# Patient Record
Sex: FEMALE | Race: BLACK OR AFRICAN AMERICAN | Employment: UNEMPLOYED | URBAN - METROPOLITAN AREA
[De-identification: names, ages, dates, MRNs, and addresses within clinical notes are randomized per-mention and may not be internally consistent; named-entity substitution may affect disease eponyms.]

---

## 2017-01-16 ENCOUNTER — ANTI-COAG VISIT (OUTPATIENT)
Dept: ONCOLOGY | Age: 82
End: 2017-01-16

## 2017-01-16 VITALS — SYSTOLIC BLOOD PRESSURE: 124 MMHG | DIASTOLIC BLOOD PRESSURE: 63 MMHG | TEMPERATURE: 97.6 F | HEART RATE: 60 BPM

## 2017-01-16 DIAGNOSIS — I82.409 THROMBOEMBOLISM OF DEEP VEINS OF LOWER EXTREMITY, UNSPECIFIED LATERALITY (HCC): Primary | ICD-10-CM

## 2017-01-16 LAB
INR BLD: 2.5 (ref 2–3)
VALID INTERNAL CONTROL?: YES

## 2017-01-17 ENCOUNTER — HOSPITAL ENCOUNTER (OUTPATIENT)
Dept: LAB | Age: 82
Discharge: HOME | End: 2017-01-17

## 2017-01-17 ENCOUNTER — OFFICE VISIT (OUTPATIENT)
Dept: FAMILY MEDICINE CLINIC | Age: 82
End: 2017-01-17

## 2017-01-17 VITALS
SYSTOLIC BLOOD PRESSURE: 132 MMHG | DIASTOLIC BLOOD PRESSURE: 82 MMHG | HEIGHT: 59 IN | OXYGEN SATURATION: 98 % | RESPIRATION RATE: 16 BRPM | TEMPERATURE: 97.9 F | HEART RATE: 58 BPM

## 2017-01-17 DIAGNOSIS — E55.9 VITAMIN D DEFICIENCY: ICD-10-CM

## 2017-01-17 DIAGNOSIS — J30.2 SEASONAL ALLERGIC RHINITIS, UNSPECIFIED ALLERGIC RHINITIS TRIGGER: ICD-10-CM

## 2017-01-17 DIAGNOSIS — Z23 ENCOUNTER FOR IMMUNIZATION: ICD-10-CM

## 2017-01-17 DIAGNOSIS — M17.0 PRIMARY OSTEOARTHRITIS OF BOTH KNEES: ICD-10-CM

## 2017-01-17 DIAGNOSIS — E78.5 HYPERLIPIDEMIA WITH TARGET LDL LESS THAN 130: ICD-10-CM

## 2017-01-17 DIAGNOSIS — I10 ESSENTIAL HYPERTENSION: Primary | ICD-10-CM

## 2017-01-17 DIAGNOSIS — E83.42 HYPOMAGNESEMIA: ICD-10-CM

## 2017-01-17 PROCEDURE — 99001 SPECIMEN HANDLING PT-LAB: CPT | Performed by: FAMILY MEDICINE

## 2017-01-17 NOTE — MR AVS SNAPSHOT
Visit Information Date & Time Provider Department Dept. Phone Encounter #  
 1/17/2017 10:45 AM Liat Sousa, [address] [facility] [address] [id number] Follow-up Instructions Return in about 4 months (around 5/17/2017). Your Appointments 2/1/2017  9:00 AM  
ANTICOAG NURSE with [facility] [facility] ([facility]) Appt Note: inr  
 [address] [facility] [address] [facility] [address] [facility] [address]  
  
   
 [address]  
  
    
 2/13/2017 10:15 AM  
Office Visit with John De La Cruz MD  
[address]  Oncology [facility]) Appt Note: [address], [address] [place] [address] Upcoming Health Maintenance Date Due  
 GLAUCOMA SCREENING Q2Y 4/9/2016 MEDICARE YEARLY EXAM 7/6/2017 DTaP/Tdap/Td series (2 - Td) 7/5/2026 Allergies as of 1/17/2017  Review Complete On: 1/17/2017 By: Liat Sousa MD  
  
 Severity Noted Reaction Type Reactions Lipitor [Atorvastatin]  07/22/2013   Side Effect Myalgia Current Immunizations  Reviewed on 9/22/2014 Name Date Influenza High Dose Vaccine PF 1/17/2017 Influenza Vaccine PF 9/22/2014 Pneumococcal Conjugate (PCV-13) 7/5/2016 Pneumococcal Polysaccharide (PPSV-23) 9/19/2013 Not reviewed this visit You Were Diagnosed With   
  
 Codes Comments Essential hypertension    -  Primary ICD-10-CM: I10 
ICD-9-CM: 401.9 Encounter for immunization     ICD-10-CM: M78 ICD-9-CM: V03.89 Hyperlipidemia with target LDL less than 130     ICD-10-CM: E78.5 ICD-9-CM: 272.4 Vitamin D deficiency     ICD-10-CM: E55.9 ICD-9-CM: 268.9 Hypomagnesemia     ICD-10-CM: Z52.72 
ICD-9-CM: 275.2 Primary osteoarthritis of both knees     ICD-10-CM: M17.0 ICD-9-CM: 715.16   
 Seasonal allergic rhinitis, unspecified allergic rhinitis trigger     ICD-10-CM: J30.2 ICD-9-CM: 477.9 Vitals BP Pulse Temp Resp Height(growth percentile) SpO2  
 132/82 (BP 1 Location: Left arm, BP Patient Position: Sitting) (!) 58 97.9 °F (36.6 °C) (Oral) 16 4' 11\" (1.499 m) 98% OB Status Smoking Status Postmenopausal Never Smoker Preferred Pharmacy Pharmacy Name Phone [facility][id number] [address] [phone] Your Updated Medication List  
  
   
This list is accurate as of: 1/17/17 11:22 AM.  Always use your most recent med list.  
  
  
  
  
 albuterol 90 mcg/actuation inhaler Commonly known as:  PROVENTIL HFA, VENTOLIN HFA, PROAIR HFA Take 2 Puffs by inhalation every four (4) hours as needed for Wheezing. atenolol 50 mg tablet Commonly known as:  TENORMIN  
TAKE 1 TABLET (50 MG) BY ORAL ROUTE ONCE DAILY  
  
 cetirizine 10 mg tablet Commonly known as:  ZYRTEC Take 1 Tab by mouth daily. ergocalciferol 50,000 unit capsule Commonly known as:  ERGOCALCIFEROL Take 1 Cap by mouth every seven (7) days. fluticasone 50 mcg/actuation nasal spray Commonly known as:  FLONASE  
2 sprays each nostril once daily  
  
 magnesium oxide 400 mg tablet Commonly known as:  MAG-OX Take 1 Tab by mouth daily. miscellaneous medical supply Misc Please extend/provide mobile wheelchair  Dx: polio contracture  
  
 potassium chloride 20 mEq tablet Commonly known as:  KLOR-CON M20  
TAKE 1 TABLET (20 MEQ) BY ORAL ROUTE 2 TIMES PER DAY  
  
 pravastatin 40 mg tablet Commonly known as:  PRAVACHOL  
TAKE ONE TABLET BY MOUTH EVERY EVENING  
  
 traMADol 50 mg tablet Commonly known as:  ULTRAM  
Take 1 Tab by mouth every six (6) hours as needed for Pain.  
  
 valsartan-hydroCHLOROthiazide 160-12.5 mg per tablet Commonly known as:  DIOVAN HCT Take 1 Tab by mouth daily. * warfarin 5 mg tablet Commonly known as:  COUMADIN  
TAKE 1 TABLET (5 MG) BY ORAL ROUTE ONCE DAILY * warfarin 1 mg tablet Commonly known as:  COUMADIN Take 1-5 tablets by mouth daily pending the INR values * Notice: This list has 2 medication(s) that are the same as other medications prescribed for you. Read the directions carefully, and ask your doctor or other care provider to review them with you. We Performed the Following INFLUENZA VIRUS VACCINE, HIGH DOSE SEASONAL, PRESERVATIVE FREE [22352 CPT(R)] Follow-up Instructions Return in about 4 months (around 5/17/2017). To-Do List   
 01/17/2017 Lab:  METABOLIC PANEL, BASIC Patient Instructions DASH Diet: Care Instructions Your Care Instructions The DASH diet is an eating plan that can help lower your blood pressure. DASH stands for Dietary Approaches to Stop Hypertension. Hypertension is high blood pressure. The DASH diet focuses on eating foods that are high in calcium, potassium, and magnesium. These nutrients can lower blood pressure. The foods that are highest in these nutrients are fruits, vegetables, low-fat dairy products, nuts, seeds, and legumes. But taking calcium, potassium, and magnesium supplements instead of eating foods that are high in those nutrients does not have the same effect. The DASH diet also includes whole grains, fish, and poultry. The DASH diet is one of several lifestyle changes your doctor may recommend to lower your high blood pressure. Your doctor may also want you to decrease the amount of sodium in your diet. Lowering sodium while following the DASH diet can lower blood pressure even further than just the DASH diet alone. Follow-up care is a key part of your treatment and safety. Be sure to make and go to all appointments, and call your doctor if you are having problems.  It's also a good idea to know your test results and keep a list of the medicines you take. How can you care for yourself at home? Following the DASH diet · Eat 4 to 5 servings of fruit each day. A serving is 1 medium-sized piece of fruit, ½ cup chopped or canned fruit, 1/4 cup dried fruit, or 4 ounces (½ cup) of fruit juice. Choose fruit more often than fruit juice. · Eat 4 to 5 servings of vegetables each day. A serving is 1 cup of lettuce or raw leafy vegetables, ½ cup of chopped or cooked vegetables, or 4 ounces (½ cup) of vegetable juice. Choose vegetables more often than vegetable juice. · Get 2 to 3 servings of low-fat and fat-free dairy each day. A serving is 8 ounces of milk, 1 cup of yogurt, or 1 ½ ounces of cheese. · Eat 6 to 8 servings of grains each day. A serving is 1 slice of bread, 1 ounce of dry cereal, or ½ cup of cooked rice, pasta, or cooked cereal. Try to choose whole-grain products as much as possible. · Limit lean meat, poultry, and fish to 2 servings each day. A serving is 3 ounces, about the size of a deck of cards. · Eat 4 to 5 servings of nuts, seeds, and legumes (cooked dried beans, lentils, and split peas) each week. A serving is 1/3 cup of nuts, 2 tablespoons of seeds, or ½ cup of cooked beans or peas. · Limit fats and oils to 2 to 3 servings each day. A serving is 1 teaspoon of vegetable oil or 2 tablespoons of salad dressing. · Limit sweets and added sugars to 5 servings or less a week. A serving is 1 tablespoon jelly or jam, ½ cup sorbet, or 1 cup of lemonade. · Eat less than 2,300 milligrams (mg) of sodium a day. If you limit your sodium to 1,500 mg a day, you can lower your blood pressure even more. Tips for success · Start small. Do not try to make dramatic changes to your diet all at once. You might feel that you are missing out on your favorite foods and then be more likely to not follow the plan. Make small changes, and stick with them. Once those changes become habit, add a few more changes. · Try some of the following: ¨ Make it a goal to eat a fruit or vegetable at every meal and at snacks. This will make it easy to get the recommended amount of fruits and vegetables each day. ¨ Try yogurt topped with fruit and nuts for a snack or healthy dessert. ¨ Add lettuce, tomato, cucumber, and onion to sandwiches. ¨ Combine a ready-made pizza crust with low-fat mozzarella cheese and lots of vegetable toppings. Try using tomatoes, squash, spinach, broccoli, carrots, cauliflower, and onions. ¨ Have a variety of cut-up vegetables with a low-fat dip as an appetizer instead of chips and dip. ¨ Sprinkle sunflower seeds or chopped almonds over salads. Or try adding chopped walnuts or almonds to cooked vegetables. ¨ Try some vegetarian meals using beans and peas. Add garbanzo or kidney beans to salads. Make burritos and tacos with mashed erwin beans or black beans. Where can you learn more? Go to http://lawrence-yennifer.info/. Enter J861 in the search box to learn more about \"DASH Diet: Care Instructions. \" Current as of: March 23, 2016 Content Version: 11.1 © 0426-7188 [employer]. Care instructions adapted under license by [employer] (which disclaims liability or warranty for this information). If you have questions about a medical condition or this instruction, always ask your healthcare professional. [place] [address] any warranty or liability for your use of this information. Introducing [facility]! Winsome Kidd introduces LoveLula patient portal. Now you can access parts of your medical record, email your doctor's office, and request medication refills online. 1. In your internet browser, go to [email or web address]. Biglion/"[employer] 2. Click on the First Time User? Click Here link in the Sign In box. You will see the New Member Sign Up page. 3. Enter your LoveLula Access Code exactly as it appears below.  You will not need to use this code after youve completed the sign-up process. If you do not sign up before the expiration date, you must request a new code. · Streamup Access Code: [email or web address] Expires: 1/24/2017 10:49 AM 
 
4. Enter the last four digits of your Social Security Number (xxxx) and Date of Birth (mm/dd/yyyy) as indicated and click Submit. You will be taken to the next sign-up page. 5. Create a Streamup ID. This will be your Streamup login ID and cannot be changed, so think of one that is secure and easy to remember. 6. Create a Streamup password. You can change your password at any time. 7. Enter your Password Reset Question and Answer. This can be used at a later time if you forget your password. 8. Enter your e-mail address. You will receive e-mail notification when new information is available in [address]. 9. Click Sign Up. You can now view and download portions of your medical record. 10. Click the Download Summary menu link to download a portable copy of your medical information. If you have questions, please visit the Frequently Asked Questions section of the Streamup website. Remember, Streamup is NOT to be used for urgent needs. For medical emergencies, dial 911. Now available from your iPhone and Android! Please provide this summary of care documentation to your next provider. Your primary care clinician is listed as Vandana Jeter. If you have any questions after today's visit, please call [phone].

## 2017-01-17 NOTE — PROGRESS NOTES
Chief Complaint   Patient presents with    Hypertension    Cholesterol Problem    Osteoarthritis     Patient presents for flu vaccine. Consent obtained, Tolerated procedure well at right deltoid. Patient remained in office 10 minutes post vaccine.  No side effects noted    Lot# [id number]  Exp: 06/19/2017  [facility]  [address]: [id number]

## 2017-01-17 NOTE — PATIENT INSTRUCTIONS

## 2017-01-17 NOTE — PROGRESS NOTES
Valente Soares, 80 y.o.,  female    SUBJECTIVE  Routine ff-up    HTN- taking medications without problems, denies cp, sob, leg edema. Inconsistent with diet, she drinks mountain dew regularly. Continues to follow heme for chronic anticoag for h/o dvt     bilateral knee pain/OA-takes tramadol about every other day. H/o hypomg, vit d def. Inconsistent with po replacement, 'forgets' to take. 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 90 Tab 3    ergocalciferol (ERGOCALCIFEROL) 50,000 unit capsule Take 1 Cap by mouth every seven (7) days. 12 Cap 3    warfarin (COUMADIN) 5 mg tablet TAKE 1 TABLET (5 MG) BY ORAL ROUTE ONCE DAILY 30 Tab 11    pravastatin (PRAVACHOL) 40 mg tablet TAKE ONE TABLET BY MOUTH EVERY EVENING 30 Tab 11    valsartan-hydrochlorothiazide (DIOVAN HCT) 160-12.5 mg per tablet Take 1 Tab by mouth daily. 30 Tab 11    potassium chloride (KLOR-CON M20) 20 mEq tablet TAKE 1 TABLET (20 MEQ) BY ORAL ROUTE 2 TIMES PER DAY 60 Tab 5    albuterol (PROVENTIL HFA, VENTOLIN HFA, PROAIR HFA) 90 mcg/actuation inhaler Take 2 Puffs by inhalation every four (4) hours as needed for Wheezing.  1 Inhaler 0    fluticasone (FLONASE) 50 mcg/actuation nasal spray 2 sprays each nostril once daily 1 Bottle 0    traMADol (ULTRAM) 50 mg tablet Take 1 Tab by mouth every six (6) hours as needed for Pain. 30 Tab 2    cetirizine (ZYRTEC) 10 mg tablet Take 1 Tab by mouth daily. 30 Tab 11    miscellaneous medical supply misc Please extend/provide mobile wheelchair  Dx: polio contracture 1 Each 0    warfarin (COUMADIN) 1 mg tablet Take 1-5 tablets by mouth daily pending the INR values 150 Tab 11       Allergies   Allergen Reactions    Lipitor [Atorvastatin] Myalgia       Past Medical History   Diagnosis Date    Diverticulosis     Hypercholesterolemia     Hypertension     Polio     Thromboembolus ([address] 2012     Left leg, indefinite anticoag Dr. Gen Araya NR goal 1.5-2       Social History     Social History    Marital status: UNKNOWN     Spouse name: N/A    Number of children: N/A    Years of education: N/A     Occupational History    n/a      Social History Main Topics    Smoking status: Never Smoker    Smokeless tobacco: Never Used    Alcohol use No    Drug use: Not on file    Sexual activity: Not Currently     Partners: Male     Other Topics Concern    Not on file     Social History Narrative       Family History   Problem Relation Age of Onset    Asthma Father          OBJECTIVE    Physical Exam:     Visit Vitals    /82 (BP 1 Location: Left arm, BP Patient Position: Sitting)    Pulse (!) 58    Temp 97.9 °F (36.6 °C) (Oral)    Resp 16    Ht 4' 11\" (1.499 m)    SpO2 98%       General: alert, wheelchair , in no apparent distress or pain  CVS: normal rate, regular rhythm  Lungs:clear to ausculation bilaterally, no crackles, wheezing or rhonchi noted  Abdomen: normoactive bowel sounds, soft, non-tender  Extremities:  R leg atrophy  Skin: warm, no lesions, rashes noted  Psych:  mood and affect normal      ASSESSMENT/PLAN  Earnest Cabral was seen today for hypertension and cholesterol problem.     Diagnoses and all orders for this visit:    Essential hypertension-controlled, cont diovan/Kcl, check Mg h/o low levels  Orders:  -     METABOLIC PANEL, COMPREHENSIVE; Future/Magnesium    Polio    Hyperlipidemia   Good range on pravachol   Update 7/17, to be ordered on next visit. Primary osteoarthritis of both knees-persistent  Cont prn tramadol, c/w . Wt loss reiterated, avoid sodas    Vitamin D deficiency  H/o mild, inconsistent with po replacement, reminded  Orders:  -     VITAMIN D, 25 HYDROXY; Future    Hypomagnesemia  Orders:  -     MAGNESIUM; Future    Encounter for vaccine  fluzone HD today    Follow-up Disposition:  Return in about 4 months (around 5/17/2017). Patient understands plan of care. Patient has provided input and agrees with goals.

## 2017-02-01 ENCOUNTER — ANTI-COAG VISIT (OUTPATIENT)
Dept: ONCOLOGY | Age: 82
End: 2017-02-01

## 2017-02-01 VITALS — TEMPERATURE: 96.8 F | HEART RATE: 58 BPM | SYSTOLIC BLOOD PRESSURE: 146 MMHG | DIASTOLIC BLOOD PRESSURE: 55 MMHG

## 2017-02-01 DIAGNOSIS — I82.409 THROMBOEMBOLISM OF DEEP VEINS OF LOWER EXTREMITY, UNSPECIFIED LATERALITY (HCC): Primary | ICD-10-CM

## 2017-02-01 LAB
INR BLD: 2.5 (ref 2–3)
VALID INTERNAL CONTROL?: YES

## 2017-02-13 RX ORDER — POTASSIUM CHLORIDE 1500 MG/1
TABLET, EXTENDED RELEASE ORAL
Qty: 60 TAB | Refills: 0 | Status: SHIPPED | OUTPATIENT
Start: 2017-02-13 | End: 2017-04-14 | Stop reason: SDUPTHER

## 2017-02-13 NOTE — TELEPHONE ENCOUNTER
Last OV 01/17/2017  Next OV 05/18/2017  Last K+ 01/17/2017, 4.5  Last refilled  02/17/2016  60 tabs  5 refills  Medication approved per verbal order from Dr. Jacquelyn Talley.

## 2017-02-15 ENCOUNTER — OFFICE VISIT (OUTPATIENT)
Dept: ONCOLOGY | Age: 82
End: 2017-02-15

## 2017-02-15 ENCOUNTER — ANTI-COAG VISIT (OUTPATIENT)
Dept: ONCOLOGY | Age: 82
End: 2017-02-15

## 2017-02-15 ENCOUNTER — HOSPITAL ENCOUNTER (OUTPATIENT)
Dept: LAB | Age: 82
Discharge: HOME | End: 2017-02-15
Payer: MEDICARE

## 2017-02-15 ENCOUNTER — HOSPITAL ENCOUNTER (OUTPATIENT)
Dept: ONCOLOGY | Age: 82
Discharge: HOME | End: 2017-02-15

## 2017-02-15 VITALS
HEART RATE: 76 BPM | DIASTOLIC BLOOD PRESSURE: 78 MMHG | SYSTOLIC BLOOD PRESSURE: 130 MMHG | HEIGHT: 59 IN | TEMPERATURE: 97.2 F

## 2017-02-15 DIAGNOSIS — D68.59 THROMBOPHILIA (HCC): ICD-10-CM

## 2017-02-15 DIAGNOSIS — I82.409 THROMBOEMBOLISM OF DEEP VEINS OF LOWER EXTREMITY, UNSPECIFIED LATERALITY (HCC): ICD-10-CM

## 2017-02-15 DIAGNOSIS — D68.59 THROMBOPHILIA (HCC): Primary | ICD-10-CM

## 2017-02-15 DIAGNOSIS — E55.9 VITAMIN D DEFICIENCY: ICD-10-CM

## 2017-02-15 LAB
ALBUMIN SERPL BCP-MCNC: 3.3 G/DL (ref 3.4–5)
ALBUMIN/GLOB SERPL: 0.9 {RATIO} (ref 0.8–1.7)
ALP SERPL-CCNC: 159 U/L (ref 45–117)
ALT SERPL-CCNC: 14 U/L (ref 13–56)
ANION GAP BLD CALC-SCNC: 7 MMOL/L (ref 3–18)
AST SERPL W P-5'-P-CCNC: 13 U/L (ref 15–37)
BASO+EOS+MONOS # BLD AUTO: 0.6 K/UL (ref 0–2.3)
BASO+EOS+MONOS # BLD AUTO: 7 % (ref 0.1–17)
BILIRUB SERPL-MCNC: 0.4 MG/DL (ref 0.2–1)
BUN SERPL-MCNC: 16 MG/DL (ref 7–18)
BUN/CREAT SERPL: 36 (ref 12–20)
CALCIUM SERPL-MCNC: 8.5 MG/DL (ref 8.5–10.1)
CHLORIDE SERPL-SCNC: 103 MMOL/L (ref 100–108)
CO2 SERPL-SCNC: 32 MMOL/L (ref 21–32)
CREAT SERPL-MCNC: 0.45 MG/DL (ref 0.6–1.3)
DIFFERENTIAL METHOD BLD: ABNORMAL
ERYTHROCYTE [DISTWIDTH] IN BLOOD BY AUTOMATED COUNT: 12.5 % (ref 11.5–14.5)
GLOBULIN SER CALC-MCNC: 3.8 G/DL (ref 2–4)
GLUCOSE SERPL-MCNC: 91 MG/DL (ref 74–99)
HCT VFR BLD AUTO: 38.3 % (ref 36–48)
HGB BLD-MCNC: 12 G/DL (ref 12–16)
INR BLD: 2.9 (ref 2–3)
LYMPHOCYTES # BLD AUTO: 16 % (ref 14–44)
LYMPHOCYTES # BLD: 1.5 K/UL (ref 1.1–5.9)
MCH RBC QN AUTO: 28.4 PG (ref 25–35)
MCHC RBC AUTO-ENTMCNC: 31.3 G/DL (ref 31–37)
MCV RBC AUTO: 90.5 FL (ref 78–102)
NEUTS SEG # BLD: 7 K/UL (ref 1.8–9.5)
NEUTS SEG NFR BLD AUTO: 77 % (ref 40–70)
PLATELET # BLD AUTO: 270 K/UL (ref 140–440)
POTASSIUM SERPL-SCNC: 4.4 MMOL/L (ref 3.5–5.5)
PROT SERPL-MCNC: 7.1 G/DL (ref 6.4–8.2)
RBC # BLD AUTO: 4.23 M/UL (ref 4.1–5.1)
SODIUM SERPL-SCNC: 142 MMOL/L (ref 136–145)
VALID INTERNAL CONTROL?: YES
WBC # BLD AUTO: 9.1 K/UL (ref 4.5–13)

## 2017-02-15 PROCEDURE — 80053 COMPREHEN METABOLIC PANEL: CPT | Performed by: NURSE PRACTITIONER

## 2017-02-15 PROCEDURE — 36415 COLL VENOUS BLD VENIPUNCTURE: CPT | Performed by: NURSE PRACTITIONER

## 2017-02-15 NOTE — PROGRESS NOTES
Hematology/Oncology  Progress Note    Name: Asim Mack  Date: 2/15/2017  : 1935     Primary Care Provider: Luke Goode MD      Ms. Ras Oconnell is a 80y.o. year old female who was seen for management of her thrombophilia. Current therapy: Coumadin 5 mg daily. Subjective:     Ms. Michaela Bird is a 72-year-old woman who has an underlying thrombophilia disorder. She is on chronic anticoagulation therapy. Currently she is taking 5 mg daily. Her INR value today is 2.9. She has not experienced any shortness of breath, cough, bruising, or bleeding. She remained essentially wheelchair confined. She was denied home INR device per insurance. She comes to come to  the clinic Q 2 weeks to have her INR checked. The patient has chronic arthritic discomfort in her lower extremities as well. The past medical, surgical and social history has been reviewed and remains unchanged. Past Medical History   Diagnosis Date    Diverticulosis     Hypercholesterolemia     Hypertension     Polio     Thromboembolus ([address]      Left leg, indefinite anticoag Dr. Nikki Ramsey NR goal 1.5-2     No past surgical history on file.   Social History     Social History    Marital status: UNKNOWN     Spouse name: N/A    Number of children: N/A    Years of education: N/A     Occupational History    n/a      Social History Main Topics    Smoking status: Never Smoker    Smokeless tobacco: Never Used    Alcohol use No    Drug use: Not on file    Sexual activity: Not Currently     Partners: Male     Other Topics Concern    Not on file     Social History Narrative     Family History   Problem Relation Age of Onset    Asthma Father      Current Outpatient Prescriptions   Medication Sig Dispense Refill    KLOR-CON M20 20 mEq tablet TAKE 1 TABLET (20 MEQ) BY ORAL ROUTE 2 TIMES PER DAY 60 Tab 0    atenolol (TENORMIN) 50 mg tablet TAKE 1 TABLET (50 MG) BY ORAL ROUTE ONCE DAILY 90 Tab 3    warfarin (COUMADIN) 1 mg tablet Take 1-5 tablets by mouth daily pending the INR values 150 Tab 11    magnesium oxide (MAG-OX) 400 mg tablet Take 1 Tab by mouth daily. 90 Tab 3    ergocalciferol (ERGOCALCIFEROL) 50,000 unit capsule Take 1 Cap by mouth every seven (7) days. 12 Cap 3    warfarin (COUMADIN) 5 mg tablet TAKE 1 TABLET (5 MG) BY ORAL ROUTE ONCE DAILY 30 Tab 11    pravastatin (PRAVACHOL) 40 mg tablet TAKE ONE TABLET BY MOUTH EVERY EVENING 30 Tab 11    valsartan-hydrochlorothiazide (DIOVAN HCT) 160-12.5 mg per tablet Take 1 Tab by mouth daily. 30 Tab 11    albuterol (PROVENTIL HFA, VENTOLIN HFA, PROAIR HFA) 90 mcg/actuation inhaler Take 2 Puffs by inhalation every four (4) hours as needed for Wheezing. 1 Inhaler 0    fluticasone (FLONASE) 50 mcg/actuation nasal spray 2 sprays each nostril once daily 1 Bottle 0    traMADol (ULTRAM) 50 mg tablet Take 1 Tab by mouth every six (6) hours as needed for Pain. 30 Tab 2    cetirizine (ZYRTEC) 10 mg tablet Take 1 Tab by mouth daily. 30 Tab 11    miscellaneous medical supply misc Please extend/provide mobile wheelchair  Dx: polio contracture 1 Each 0       Review of Systems  Constitutional: The patient has no acute distress or discomfort. HEENT: The patient denies recent head trauma, eye pain, blurred vision, hearing deficit, oropharyngeal mucosal pain or lesions, and the patient denies throat pain or discomfort. Lymphatics: The patient denies palpable peripheral lymphadenopathy. Hematologic: The patient denies having bruising, bleeding, or progressive fatigue. Respiratory: Patient denies having shortness of breath, cough, sputum production, fever, or dyspnea on exertion. Cardiovascular: The patient denies having leg pain, leg swelling, heart palpitations, chest pain, or lightheadedness. The patient denies having dyspnea on exertion. Gastrointestinal: The patient denies having nausea, emesis, or diarrhea. The patient denies having any hematemesis or blood in the stool.   Genitourinary: Patient denies having urinary urgency, frequency, or dysuria. The patient denies having blood in the urine. Psychological: The patient denies having symptoms of nervousness, anxiety, depression, or thoughts of harming himself some of this. Skin: Patient denies having skin rashes, skin, ulcerations, or unexplained itching or pruritus. Musculoskeletal: Occasional arthritic pain at multiple joints, not new. Wheelchair. Limited ROM at RLE 2' polio hx. Objective:     Visit Vitals    /78    Pulse 76    Temp 97.2 °F (36.2 °C) (Oral)    Ht 4' 11\" (1.499 m)     Pain Score:     Physical Exam:   Gen. Appearance: The patient is in no acute distress. Skin: There is no bruise or rash. HEENT: The exam is unremarkable. Neck: Supple without lymphadenopathy or thyromegaly. Lungs: Clear to auscultation and percussion; there are no wheezes or rhonchi. Heart: Regular rate and rhythm; there are no murmurs, gallops, or rubs. Anterior chest wall and breasts: Deferred. Abdomen: Bowel sounds are present and normal. There is no guarding, tenderness, or hepatosplenomegaly. Extremities: There is no clubbing, cyanosis, or edema. Neurologic: There are no focal neurologic deficits. Lymphatics: There is no palpable peripheral lymphadenopathy. Musculoskeletal: LROM and non-weight bearing at RLE. Otherwise FROM at all other ext's. Psychological/psychiatric: There is no clinical evidence of anxiety, depression, or melancholy. Laboratory Data:     Results for orders placed or performed during the hospital encounter of 02/15/17   CBC WITH 3 PART DIFF     Status: Abnormal   Result Value Ref Range Status    WBC 9.1 4.5 - 13.0 K/uL Final    RBC 4.23 4. 10 - 5.10 M/uL Final    HGB 12.0 12.0 - 16 g/dL Final    HCT 38.3 36 - 48 % Final    MCV 90.5 78 - 102 FL Final    MCH 28.4 25.0 - 35.0 PG Final    MCHC 31.3 31 - 37 g/dL Final    RDW 12.5 11.5 - 14.5 % Final    PLATELET 322 997 - 393 K/uL Final    NEUTROPHILS 77 (H) 40 - 70 % Final MIXED CELLS 7 0.1 - 17 % Final    LYMPHOCYTES 16 14 - 44 % Final    ABS. NEUTROPHILS 7.0 1.8 - 9.5 K/UL Final    ABS. MIXED CELLS 0.6 0.0 - 2.3 K/uL Final    ABS. LYMPHOCYTES 1.5 1.1 - 5.9 K/UL Final     Comment: Test performed at [place] [address] Location. Results Reviewed by Medical Director. 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 Thrombophilia ([address]    2. 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 Vitamin D deficiency          Plan: Thrombophilia/DVT left lower extremity: I have explained to the patient that the current value of her INR is 2.9. I have recommended that she continue her current dose of Coumadin of 5 mg once daily. The current CBC shows a stable WBC count of 9.1, hemoglobin 12.0 g/dl , hematocrit  38.3% and the platelet count is 310,327. I will have her return to clinic for a complete reassessment again in 12 weeks. She will return to our clinic for INR check in 2 weeks. Vitamin D deficiency: The patient has previously been prescribed vitamin D supplementation. however she has not been compliant in taking the supplement. I have encourage her to start taking her Vitamin D supplement. I have explained to the patient that she can continue to take over-the-counter vitamin D with calcium supplementation once she is done with this dose. At her next clinic visit in 12 weeks we will recheck her vitamin D levels.     Primary osteoarthritis of both knees: The patient is currently taking tramadol 50 mg every 8 hours on a when necessary basis. I have advised the patient to also use Tylenol arthritis 2 tablets every 8 hours as needed. Since she is on long-term anticoagulation the patient was advised not to use over-the-counter NSAIDs such as aspirin, Motrin, or Aleve. These medications can increase the risk of bleeding again also impair renal function. The patient will return to clinic for a follow-up visit in 3 months. Follow-up Disposition:  Return in about 4 months (around 6/15/2017). Orders Placed This Encounter    COMPLETE CBC & AUTO DIFF WBC    InHouse CBC ([employer])     Standing Status:   Future     Number of Occurrences:   1     Standing Expiration Date:   9/03/7182    METABOLIC PANEL, COMPREHENSIVE     Standing Status:   Future     Standing Expiration Date:   2/16/2018    AMB POC PT/INR       Lisandra Valverde NP  02/15/2017    I have assessed the patient independently and  agree with the full assessment as outlined.   Mike Rahman MD, [address]

## 2017-03-01 ENCOUNTER — ANTI-COAG VISIT (OUTPATIENT)
Dept: ONCOLOGY | Age: 82
End: 2017-03-01

## 2017-03-01 VITALS — TEMPERATURE: 97.4 F | DIASTOLIC BLOOD PRESSURE: 64 MMHG | HEART RATE: 64 BPM | SYSTOLIC BLOOD PRESSURE: 132 MMHG

## 2017-03-01 DIAGNOSIS — D68.59 THROMBOPHILIA (HCC): ICD-10-CM

## 2017-03-01 DIAGNOSIS — I82.409 THROMBOEMBOLISM OF DEEP VEINS OF LOWER EXTREMITY, UNSPECIFIED LATERALITY (HCC): Primary | ICD-10-CM

## 2017-03-01 LAB
INR BLD: 2.5 (ref 2–3)
VALID INTERNAL CONTROL?: YES

## 2017-03-02 ENCOUNTER — TELEPHONE (OUTPATIENT)
Dept: FAMILY MEDICINE CLINIC | Age: 82
End: 2017-03-02

## 2017-03-02 NOTE — TELEPHONE ENCOUNTER
Patient is requesting (New  Updated) referral to the following office:    Speciality: Hematology/ Oncology   Specialist Name: Dr. Venecia Winter Location: [address] [place] [address]  Phone (if available): [phone]  Fax (if available): [phone]  Diagnosis: continuing care   Date of appointment (if scheduled): 3/19/17

## 2017-03-06 ENCOUNTER — TELEPHONE (OUTPATIENT)
Dept: FAMILY MEDICINE CLINIC | Age: 82
End: 2017-03-06

## 2017-03-06 NOTE — TELEPHONE ENCOUNTER
Patient is requesting (New  Updated) referral to the following office:    Speciality: Oncology  Specialist Name: Florecita Mannie  Office Location: [facility] [address] [place] [address]  Phone (if available): [phone]  Fax (if available): [phone]  Diagnosis: Thrombophilia ([facility] [D68.59]  Date of appointment (if scheduled): [employer] Referral Needs to be back dated to 3/1/2017.

## 2017-03-06 NOTE — TELEPHONE ENCOUNTER
[employer] approved Vikash Quesada # [id number]  Start date 03/01/2017  End date 03/01/2018  See scanned document

## 2017-03-07 ENCOUNTER — TELEPHONE (OUTPATIENT)
Dept: FAMILY MEDICINE CLINIC | Age: 82
End: 2017-03-07

## 2017-03-07 NOTE — TELEPHONE ENCOUNTER
Left message for Ms Carlos Wiggins in regards to Dr. Jacquelyn Talley approving hospital bed.  Ask that she return my call with fax information to submit order

## 2017-03-07 NOTE — TELEPHONE ENCOUNTER
Earl Ramsey patient's [facility] coordinator  Is requesting an order for a hospital bed. Please advise.

## 2017-03-15 ENCOUNTER — ANTI-COAG VISIT (OUTPATIENT)
Dept: ONCOLOGY | Age: 82
End: 2017-03-15

## 2017-03-15 DIAGNOSIS — D68.59 THROMBOPHILIA (HCC): ICD-10-CM

## 2017-03-15 DIAGNOSIS — I82.409 THROMBOEMBOLISM OF DEEP VEINS OF LOWER EXTREMITY, UNSPECIFIED LATERALITY (HCC): Primary | ICD-10-CM

## 2017-03-15 LAB
INR BLD: 2.6 (ref 2–3)
VALID INTERNAL CONTROL?: YES

## 2017-03-29 ENCOUNTER — ANTI-COAG VISIT (OUTPATIENT)
Dept: ONCOLOGY | Age: 82
End: 2017-03-29

## 2017-03-29 VITALS — DIASTOLIC BLOOD PRESSURE: 69 MMHG | SYSTOLIC BLOOD PRESSURE: 146 MMHG | TEMPERATURE: 97.8 F | HEART RATE: 62 BPM

## 2017-03-29 DIAGNOSIS — I82.409 THROMBOEMBOLISM OF DEEP VEINS OF LOWER EXTREMITY, UNSPECIFIED LATERALITY (HCC): Primary | ICD-10-CM

## 2017-03-29 LAB
INR BLD: 2.1 (ref 2–3)
VALID INTERNAL CONTROL?: YES

## 2017-04-17 RX ORDER — VALSARTAN AND HYDROCHLOROTHIAZIDE 160; 12.5 MG/1; MG/1
TABLET, FILM COATED ORAL
Qty: 30 TAB | Refills: 10 | Status: SHIPPED | OUTPATIENT
Start: 2017-04-17 | End: 2018-08-07 | Stop reason: SDUPTHER

## 2017-04-17 RX ORDER — POTASSIUM CHLORIDE 1500 MG/1
TABLET, EXTENDED RELEASE ORAL
Qty: 60 TAB | Refills: 0 | Status: SHIPPED | OUTPATIENT
Start: 2017-04-17 | End: 2017-06-07 | Stop reason: SDUPTHER

## 2017-04-17 NOTE — TELEPHONE ENCOUNTER
Patient states she is completely out of her valsartan-hydrochlorothiazide (DIOVAN HCT) 160-12.5 mg per tablet.  Please send refill to pharmacy per request.

## 2017-04-19 ENCOUNTER — ANTI-COAG VISIT (OUTPATIENT)
Dept: ONCOLOGY | Age: 82
End: 2017-04-19

## 2017-04-19 VITALS — TEMPERATURE: 97.6 F | SYSTOLIC BLOOD PRESSURE: 144 MMHG | HEART RATE: 62 BPM | DIASTOLIC BLOOD PRESSURE: 74 MMHG

## 2017-04-19 DIAGNOSIS — I82.409 THROMBOEMBOLISM OF DEEP VEINS OF LOWER EXTREMITY, UNSPECIFIED LATERALITY (HCC): Primary | ICD-10-CM

## 2017-04-19 LAB
INR BLD: 2.2 (ref 2–3)
VALID INTERNAL CONTROL?: YES

## 2017-05-02 ENCOUNTER — TELEPHONE (OUTPATIENT)
Dept: FAMILY MEDICINE CLINIC | Age: 82
End: 2017-05-02

## 2017-05-03 ENCOUNTER — ANTI-COAG VISIT (OUTPATIENT)
Dept: ONCOLOGY | Age: 82
End: 2017-05-03

## 2017-05-03 DIAGNOSIS — I82.409 THROMBOEMBOLISM OF DEEP VEINS OF LOWER EXTREMITY, UNSPECIFIED LATERALITY (HCC): Primary | ICD-10-CM

## 2017-05-03 LAB
INR BLD: 2.3 (ref 2–3)
VALID INTERNAL CONTROL?: YES

## 2017-05-17 ENCOUNTER — ANTI-COAG VISIT (OUTPATIENT)
Dept: ONCOLOGY | Age: 82
End: 2017-05-17

## 2017-05-17 DIAGNOSIS — I82.409 THROMBOEMBOLISM OF DEEP VEINS OF LOWER EXTREMITY, UNSPECIFIED LATERALITY (HCC): Primary | ICD-10-CM

## 2017-05-17 LAB
INR BLD: 2.8 (ref 2–3)
VALID INTERNAL CONTROL?: YES

## 2017-05-18 ENCOUNTER — OFFICE VISIT (OUTPATIENT)
Dept: FAMILY MEDICINE CLINIC | Age: 82
End: 2017-05-18

## 2017-05-18 VITALS
DIASTOLIC BLOOD PRESSURE: 78 MMHG | OXYGEN SATURATION: 95 % | HEIGHT: 59 IN | HEART RATE: 62 BPM | RESPIRATION RATE: 16 BRPM | TEMPERATURE: 97.7 F | SYSTOLIC BLOOD PRESSURE: 126 MMHG

## 2017-05-18 DIAGNOSIS — I10 ESSENTIAL HYPERTENSION: Primary | ICD-10-CM

## 2017-05-18 DIAGNOSIS — E78.5 HYPERLIPIDEMIA WITH TARGET LDL LESS THAN 130: ICD-10-CM

## 2017-05-18 DIAGNOSIS — E55.9 VITAMIN D DEFICIENCY: ICD-10-CM

## 2017-05-18 DIAGNOSIS — E83.42 HYPOMAGNESEMIA: ICD-10-CM

## 2017-05-18 DIAGNOSIS — M17.0 PRIMARY OSTEOARTHRITIS OF BOTH KNEES: ICD-10-CM

## 2017-05-18 NOTE — PROGRESS NOTES
Chief Complaint   Patient presents with    Hypertension    Cholesterol Problem    Vitamin D Deficiency     1. Have you been to the ER, urgent care clinic since your last visit? Hospitalized since your last visit? No    2. Have you seen or consulted any other health care providers outside of the [address] [place] [address] since your last visit? Include any pap smears or colon screening.  No\

## 2017-05-18 NOTE — TELEPHONE ENCOUNTER
Iman Polk from [facility] called and states the approval for heavy duty bed has been denied due to no documentation of weight. Iman Polk states a peer to peer cam]n be done or letter of appeal.   Iman Polk phone # [phone] if you want to do peer to peer. Iman Polk stated due to her being the nurse that she was not able to approve but maybe physician to physician could get approval. Pleas advise.

## 2017-05-18 NOTE — PATIENT INSTRUCTIONS

## 2017-05-18 NOTE — PROGRESS NOTES
Emma Wynn, 80 y.o.,  female    SUBJECTIVE  Routine ff-up    HTN- taking medications without problems, denies cp, sob, leg edema. Inconsistent with diet, she drinks mountain dew regularly, advised to cut down. Continues to follow heme for chronic anticoag for h/o dvt     bilateral knee pain/OA-takes tramadol about every day. H/o hypomg, vit d def. Inconsistent with po replacement, 'forgets' to take. 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    Ventral hernia K43.9    Allergic rhinitis due to allergen J30.9    Primary osteoarthritis of both knees M17.0    Advance directive discussed with patient Z70.80    Vitamin D deficiency E55.9    Hypomagnesemia E83.42       Current Outpatient Prescriptions   Medication Sig Dispense Refill    pravastatin (PRAVACHOL) 40 mg tablet TAKE 1  TABLET BY MOUTH EVERY EVENING 90 Tab 3    valsartan-hydroCHLOROthiazide (DIOVAN-HCT) 160-12.5 mg per tablet TAKE 1 TABLET BY ORAL ROUTE ONCE DAILY 30 Tab 10    KLOR-CON M20 20 mEq tablet TAKE ONE TABLET BY MOUTH 2 TIMES PER DAY  60 Tab 0    traMADol (ULTRAM) 50 mg tablet TAKE ONE TABLET BY MOUTH EVERY 6 HOURS AS NEEDED FOR PAIN 30 Tab 0    atenolol (TENORMIN) 50 mg tablet TAKE 1 TABLET (50 MG) BY ORAL ROUTE ONCE DAILY 90 Tab 3    magnesium oxide (MAG-OX) 400 mg tablet Take 1 Tab by mouth daily. 90 Tab 3    ergocalciferol (ERGOCALCIFEROL) 50,000 unit capsule Take 1 Cap by mouth every seven (7) days.  12 Cap 3    warfarin (COUMADIN) 5 mg tablet TAKE 1 TABLET (5 MG) BY ORAL ROUTE ONCE DAILY 30 Tab 11    fluticasone (FLONASE) 50 mcg/actuation nasal spray 2 sprays each nostril once daily 1 Bottle 0    cetirizine (ZYRTEC) 10 mg tablet Take 1 Tab by mouth daily. 30 Tab 11    miscellaneous medical supply misc Please extend/provide mobile wheelchair  Dx: polio contracture 1 Each 0    warfarin (COUMADIN) 1 mg tablet Take 1-5 tablets by mouth daily pending the INR values 150 Tab 11    albuterol (PROVENTIL HFA, VENTOLIN HFA, PROAIR HFA) 90 mcg/actuation inhaler Take 2 Puffs by inhalation every four (4) hours as needed for Wheezing. 1 Inhaler 0       Allergies   Allergen Reactions    Lipitor [Atorvastatin] Myalgia       Past Medical History:   Diagnosis Date    Diverticulosis     Hypercholesterolemia     Hypertension     Polio     Thromboembolus ([address] 2012    Left leg, indefinite anticoag Dr. Mario Johnson NR goal 1.5-2       Social History     Social History    Marital status: UNKNOWN     Spouse name: N/A    Number of children: N/A    Years of education: N/A     Occupational History    n/a      Social History Main Topics    Smoking status: Never Smoker    Smokeless tobacco: Never Used    Alcohol use No    Drug use: Not on file    Sexual activity: Not Currently     Partners: Male     Other Topics Concern    Not on file     Social History Narrative       Family History   Problem Relation Age of Onset    Asthma Father          OBJECTIVE    Physical Exam:     Visit Vitals    /78 (BP 1 Location: Left arm, BP Patient Position: Sitting)    Pulse 62    Temp 97.7 °F (36.5 °C) (Oral)    Resp 16    Ht 4' 11\" (1.499 m)    SpO2 95%       General: alert, wheelchair , in no apparent distress or pain  CVS: normal rate, regular rhythm  Lungs:clear to ausculation bilaterally, no crackles, wheezing or rhonchi noted  Extremities:  R leg atrophy  Skin: warm, no lesions, rashes noted  Psych:  mood and affect normal      ASSESSMENT/PLAN  Nba Hurley was seen today for hypertension and cholesterol problem.     Diagnoses and all orders for this visit:    Essential hypertension-controlled,   cont diovan  onKcl/mg replacement, inconsistent with po replacement, reiterated compliance  Orders:recheck in 2 months prior to next visit  -     METABOLIC PANEL, COMPREHENSIVE; Future  -     Magnesium    Polio    Hyperlipidemia   Good range on pravachol     Primary osteoarthritis of both knees-persistent  Cont prn tramadol, c/w . Wt loss reiterated, avoid sodas    Vitamin D deficiency  H/o mild, inconsistent with po replacement, reminded  Orders:  -     VITAMIN D, 25 HYDROXY; Future    Hypomagnesemia  Orders:  -     MAGNESIUM; Future    Follow-up Disposition:  Return in about 2 months (around 7/18/2017), or if symptoms worsen or fail to improve. Patient understands plan of care. Patient has provided input and agrees with goals.

## 2017-05-18 NOTE — MR AVS SNAPSHOT
Visit Information Date & Time Provider Department Dept. Phone Encounter #  
 5/18/2017 11:00 AM Sharee Cameron, [address] [id number] Follow-up Instructions Return in about 2 months (around 7/18/2017), or if symptoms worsen or fail to improve. Your Appointments 5/31/2017  9:20 AM  
ANTICOAG NURSE with DELTA, LAB [facility] ([facility]) Appt Note: 2 week inr  
 [address] [place], [place] [address] [facility] [address] [facility], [address] [place] [address]  
  
    
 6/14/2017 10:45 AM  
Office Visit with Wilfred Esquivel MD  
[address]  [facility]) Appt Note: 4month follow up appointment [address] [facility], [address] [place] [address] Upcoming Health Maintenance Date Due  
 GLAUCOMA SCREENING Q2Y 4/9/2016 MEDICARE YEARLY EXAM 7/6/2017 INFLUENZA AGE 9 TO ADULT 8/1/2017 DTaP/Tdap/Td series (2 - Td) 7/5/2026 Allergies as of 5/18/2017  Review Complete On: 5/18/2017 By: Sharee Cameron MD  
  
 Severity Noted Reaction Type Reactions Lipitor [Atorvastatin]  07/22/2013   Side Effect Myalgia Current Immunizations  Reviewed on 9/22/2014 Name Date Influenza High Dose Vaccine PF 1/17/2017 Influenza Vaccine PF 9/22/2014 Pneumococcal Conjugate (PCV-13) 7/5/2016 Pneumococcal Polysaccharide (PPSV-23) 9/19/2013 Not reviewed this visit You Were Diagnosed With   
  
 Codes Comments Essential hypertension    -  Primary ICD-10-CM: I10 
ICD-9-CM: 401.9 Hyperlipidemia with target LDL less than 130     ICD-10-CM: E78.5 ICD-9-CM: 272.4 Vitamin D deficiency     ICD-10-CM: E55.9 ICD-9-CM: 268.9 Hypomagnesemia     ICD-10-CM: M67.52 
ICD-9-CM: 275.2 Primary osteoarthritis of both knees     ICD-10-CM: M17.0 ICD-9-CM: 715.16 Vitals BP Pulse Temp Resp Height(growth percentile) SpO2  
 126/78 (BP 1 Location: Left arm, BP Patient Position: Sitting) 62 97.7 °F (36.5 °C) (Oral) 16 4' 11\" (1.499 m) 95% OB Status Smoking Status Postmenopausal Never Smoker Preferred Pharmacy Pharmacy Name Phone [id number] [address] [phone] Your Updated Medication List  
  
   
This list is accurate as of: 5/18/17 11:56 AM.  Always use your most recent med list.  
  
  
  
  
 albuterol 90 mcg/actuation inhaler Commonly known as:  PROVENTIL HFA, VENTOLIN HFA, PROAIR HFA Take 2 Puffs by inhalation every four (4) hours as needed for Wheezing. atenolol 50 mg tablet Commonly known as:  TENORMIN  
TAKE 1 TABLET (50 MG) BY ORAL ROUTE ONCE DAILY  
  
 cetirizine 10 mg tablet Commonly known as:  ZYRTEC Take 1 Tab by mouth daily. ergocalciferol 50,000 unit capsule Commonly known as:  ERGOCALCIFEROL Take 1 Cap by mouth every seven (7) days. fluticasone 50 mcg/actuation nasal spray Commonly known as:  FLONASE  
2 sprays each nostril once daily KLOR-CON M20 20 mEq tablet Generic drug:  potassium chloride TAKE ONE TABLET BY MOUTH 2 TIMES PER DAY  
  
 magnesium oxide 400 mg tablet Commonly known as:  MAG-OX Take 1 Tab by mouth daily. miscellaneous medical supply Misc Please extend/provide mobile wheelchair  Dx: polio contracture  
  
 pravastatin 40 mg tablet Commonly known as:  PRAVACHOL  
TAKE 1  TABLET BY MOUTH EVERY EVENING  
  
 traMADol 50 mg tablet Commonly known as:  ULTRAM  
TAKE ONE TABLET BY MOUTH EVERY 6 HOURS AS NEEDED FOR PAIN  
  
 valsartan-hydroCHLOROthiazide 160-12.5 mg per tablet Commonly known as:  DIOVAN-HCT  
TAKE 1 TABLET BY ORAL ROUTE ONCE DAILY * warfarin 5 mg tablet Commonly known as:  COUMADIN  
 TAKE 1 TABLET (5 MG) BY ORAL ROUTE ONCE DAILY * warfarin 1 mg tablet Commonly known as:  COUMADIN Take 1-5 tablets by mouth daily pending the INR values * Notice: This list has 2 medication(s) that are the same as other medications prescribed for you. Read the directions carefully, and ask your doctor or other care provider to review them with you. Follow-up Instructions Return in about 2 months (around 7/18/2017), or if symptoms worsen or fail to improve. To-Do List   
 05/18/2017 Lab:  MAGNESIUM   
  
 05/18/2017 Lab:  METABOLIC PANEL, BASIC   
  
 05/18/2017 Lab:  VITAMIN D, 25 HYDROXY Patient Instructions DASH Diet: Care Instructions Your Care Instructions The DASH diet is an eating plan that can help lower your blood pressure. DASH stands for Dietary Approaches to Stop Hypertension. Hypertension is high blood pressure. The DASH diet focuses on eating foods that are high in calcium, potassium, and magnesium. These nutrients can lower blood pressure. The foods that are highest in these nutrients are fruits, vegetables, low-fat dairy products, nuts, seeds, and legumes. But taking calcium, potassium, and magnesium supplements instead of eating foods that are high in those nutrients does not have the same effect. The DASH diet also includes whole grains, fish, and poultry. The DASH diet is one of several lifestyle changes your doctor may recommend to lower your high blood pressure. Your doctor may also want you to decrease the amount of sodium in your diet. Lowering sodium while following the DASH diet can lower blood pressure even further than just the DASH diet alone. Follow-up care is a key part of your treatment and safety. Be sure to make and go to all appointments, and call your doctor if you are having problems. It's also a good idea to know your test results and keep a list of the medicines you take. How can you care for yourself at home? Following the DASH diet · Eat 4 to 5 servings of fruit each day. A serving is 1 medium-sized piece of fruit, ½ cup chopped or canned fruit, 1/4 cup dried fruit, or 4 ounces (½ cup) of fruit juice. Choose fruit more often than fruit juice. · Eat 4 to 5 servings of vegetables each day. A serving is 1 cup of lettuce or raw leafy vegetables, ½ cup of chopped or cooked vegetables, or 4 ounces (½ cup) of vegetable juice. Choose vegetables more often than vegetable juice. · Get 2 to 3 servings of low-fat and fat-free dairy each day. A serving is 8 ounces of milk, 1 cup of yogurt, or 1 ½ ounces of cheese. · Eat 6 to 8 servings of grains each day. A serving is 1 slice of bread, 1 ounce of dry cereal, or ½ cup of cooked rice, pasta, or cooked cereal. Try to choose whole-grain products as much as possible. · Limit lean meat, poultry, and fish to 2 servings each day. A serving is 3 ounces, about the size of a deck of cards. · Eat 4 to 5 servings of nuts, seeds, and legumes (cooked dried beans, lentils, and split peas) each week. A serving is 1/3 cup of nuts, 2 tablespoons of seeds, or ½ cup of cooked beans or peas. · Limit fats and oils to 2 to 3 servings each day. A serving is 1 teaspoon of vegetable oil or 2 tablespoons of salad dressing. · Limit sweets and added sugars to 5 servings or less a week. A serving is 1 tablespoon jelly or jam, ½ cup sorbet, or 1 cup of lemonade. · Eat less than 2,300 milligrams (mg) of sodium a day. If you limit your sodium to 1,500 mg a day, you can lower your blood pressure even more. Tips for success · Start small. Do not try to make dramatic changes to your diet all at once. You might feel that you are missing out on your favorite foods and then be more likely to not follow the plan. Make small changes, and stick with them. Once those changes become habit, add a few more changes. · Try some of the following: ¨ Make it a goal to eat a fruit or vegetable at every meal and at snacks. This will make it easy to get the recommended amount of fruits and vegetables each day. ¨ Try yogurt topped with fruit and nuts for a snack or healthy dessert. ¨ Add lettuce, tomato, cucumber, and onion to sandwiches. ¨ Combine a ready-made pizza crust with low-fat mozzarella cheese and lots of vegetable toppings. Try using tomatoes, squash, spinach, broccoli, carrots, cauliflower, and onions. ¨ Have a variety of cut-up vegetables with a low-fat dip as an appetizer instead of chips and dip. ¨ Sprinkle sunflower seeds or chopped almonds over salads. Or try adding chopped walnuts or almonds to cooked vegetables. ¨ Try some vegetarian meals using beans and peas. Add garbanzo or kidney beans to salads. Make burritos and tacos with mashed erwin beans or black beans. Where can you learn more? Go to http://lawrencecloud.IQyennifer.info/. Enter F957 in the search box to learn more about \"DASH Diet: Care Instructions. \" Current as of: March 23, 2016 Content Version: 11.2 © 1240-6048 [employer], [employer]. Care instructions adapted under license by [employer] (which disclaims liability or warranty for this information). If you have questions about a medical condition or this instruction, always ask your healthcare professional. [place] [address] any warranty or liability for your use of this information. Introducing hospitals & HEALTH SERVICES! [employer] introduces Keyade patient portal. Now you can access parts of your medical record, email your doctor's office, and request medication refills online. 1. In your internet browser, go to [email or web address] 2. Click on the First Time User? Click Here link in the Sign In box. You will see the New Member Sign Up page. 3. Enter your Keyade Access Code exactly as it appears below.  You will not need to use this code after youve completed the sign-up process. If you do not sign up before the expiration date, you must request a new code. · Insportant Access Code: [email or web address] Expires: 8/16/2017 11:56 AM 
 
4. Enter the last four digits of your Social Security Number (xxxx) and Date of Birth (mm/dd/yyyy) as indicated and click Submit. You will be taken to the next sign-up page. 5. Create a Insportant ID. This will be your Insportant login ID and cannot be changed, so think of one that is secure and easy to remember. 6. Create a Insportant password. You can change your password at any time. 7. Enter your Password Reset Question and Answer. This can be used at a later time if you forget your password. 8. Enter your e-mail address. You will receive e-mail notification when new information is available in [address]. 9. Click Sign Up. You can now view and download portions of your medical record. 10. Click the Download Summary menu link to download a portable copy of your medical information. If you have questions, please visit the Frequently Asked Questions section of the Ins[employer]ant website. Remember, Insportant is NOT to be used for urgent needs. For medical emergencies, dial 911. Now available from your iPhone and Android! Please provide this summary of care documentation to your next provider. Your primary care clinician is listed as Ravin Pires. If you have any questions after today's visit, please call [phone].

## 2017-05-24 ENCOUNTER — TELEPHONE (OUTPATIENT)
Dept: FAMILY MEDICINE CLINIC | Age: 82
End: 2017-05-24

## 2017-05-24 NOTE — TELEPHONE ENCOUNTER
Patient called and states that her mattress is really hard and she can't turn very well with it. She states it was delivered by [facility]. She would like nurse to call her back.

## 2017-05-31 ENCOUNTER — ANTI-COAG VISIT (OUTPATIENT)
Dept: ONCOLOGY | Age: 82
End: 2017-05-31

## 2017-05-31 VITALS — HEART RATE: 63 BPM | DIASTOLIC BLOOD PRESSURE: 63 MMHG | TEMPERATURE: 98.2 F | SYSTOLIC BLOOD PRESSURE: 121 MMHG

## 2017-05-31 DIAGNOSIS — I82.409 THROMBOEMBOLISM OF DEEP VEINS OF LOWER EXTREMITY, UNSPECIFIED LATERALITY (HCC): Primary | ICD-10-CM

## 2017-05-31 LAB
INR BLD: 2.9 (ref 2–3)
VALID INTERNAL CONTROL?: YES

## 2017-06-07 ENCOUNTER — ANTI-COAG VISIT (OUTPATIENT)
Dept: ONCOLOGY | Age: 82
End: 2017-06-07

## 2017-06-07 DIAGNOSIS — I82.409 THROMBOEMBOLISM OF DEEP VEINS OF LOWER EXTREMITY, UNSPECIFIED LATERALITY (HCC): Primary | ICD-10-CM

## 2017-06-07 LAB
INR BLD: 2.4 (ref 2–3)
VALID INTERNAL CONTROL?: YES

## 2017-06-07 RX ORDER — POTASSIUM CHLORIDE 1500 MG/1
TABLET, EXTENDED RELEASE ORAL
Qty: 60 TAB | Refills: 0 | Status: SHIPPED | OUTPATIENT
Start: 2017-06-07 | End: 2017-08-12 | Stop reason: SDUPTHER

## 2017-06-14 ENCOUNTER — HOSPITAL ENCOUNTER (OUTPATIENT)
Dept: ONCOLOGY | Age: 82
Discharge: HOME | End: 2017-06-14

## 2017-06-14 ENCOUNTER — OFFICE VISIT (OUTPATIENT)
Dept: ONCOLOGY | Age: 82
End: 2017-06-14

## 2017-06-14 DIAGNOSIS — I82.409 THROMBOEMBOLISM OF DEEP VEINS OF LOWER EXTREMITY, UNSPECIFIED LATERALITY (HCC): ICD-10-CM

## 2017-06-14 DIAGNOSIS — I82.409 THROMBOEMBOLISM OF DEEP VEINS OF LOWER EXTREMITY, UNSPECIFIED LATERALITY (HCC): Primary | ICD-10-CM

## 2017-06-21 ENCOUNTER — ANTI-COAG VISIT (OUTPATIENT)
Dept: ONCOLOGY | Age: 82
End: 2017-06-21

## 2017-06-21 DIAGNOSIS — I82.409 THROMBOEMBOLISM OF DEEP VEINS OF LOWER EXTREMITY, UNSPECIFIED LATERALITY (HCC): Primary | ICD-10-CM

## 2017-06-21 DIAGNOSIS — D68.59 THROMBOPHILIA (HCC): ICD-10-CM

## 2017-06-21 LAB
INR BLD: 2.8 (ref 2–3)
VALID INTERNAL CONTROL?: YES

## 2017-07-05 ENCOUNTER — ANTI-COAG VISIT (OUTPATIENT)
Dept: ONCOLOGY | Age: 82
End: 2017-07-05

## 2017-07-05 ENCOUNTER — HOSPITAL ENCOUNTER (OUTPATIENT)
Dept: ONCOLOGY | Age: 82
Discharge: HOME | End: 2017-07-05

## 2017-07-05 ENCOUNTER — OFFICE VISIT (OUTPATIENT)
Dept: ONCOLOGY | Age: 82
End: 2017-07-05

## 2017-07-05 VITALS
OXYGEN SATURATION: 93 % | HEIGHT: 59 IN | HEART RATE: 52 BPM | TEMPERATURE: 94.1 F | DIASTOLIC BLOOD PRESSURE: 72 MMHG | SYSTOLIC BLOOD PRESSURE: 140 MMHG

## 2017-07-05 DIAGNOSIS — D68.59 THROMBOPHILIA (HCC): Primary | ICD-10-CM

## 2017-07-05 DIAGNOSIS — M17.0 PRIMARY OSTEOARTHRITIS OF BOTH KNEES: ICD-10-CM

## 2017-07-05 DIAGNOSIS — D72.9 NEUTROPHILIA: ICD-10-CM

## 2017-07-05 DIAGNOSIS — E55.9 VITAMIN D DEFICIENCY: ICD-10-CM

## 2017-07-05 DIAGNOSIS — D68.59 THROMBOPHILIA (HCC): ICD-10-CM

## 2017-07-05 DIAGNOSIS — I82.409 THROMBOEMBOLISM OF DEEP VEINS OF LOWER EXTREMITY, UNSPECIFIED LATERALITY (HCC): Primary | ICD-10-CM

## 2017-07-05 LAB
BASO+EOS+MONOS # BLD AUTO: 0.7 K/UL (ref 0–2.3)
BASO+EOS+MONOS # BLD AUTO: 8 % (ref 0.1–17)
DIFFERENTIAL METHOD BLD: ABNORMAL
ERYTHROCYTE [DISTWIDTH] IN BLOOD BY AUTOMATED COUNT: 13.6 % (ref 11.5–14.5)
HCT VFR BLD AUTO: 37.6 % (ref 36–48)
HGB BLD-MCNC: 12 G/DL (ref 12–16)
INR BLD: 3 (ref 2–3)
LYMPHOCYTES # BLD AUTO: 17 % (ref 14–44)
LYMPHOCYTES # BLD: 1.4 K/UL (ref 1.1–5.9)
MCH RBC QN AUTO: 29.6 PG (ref 25–35)
MCHC RBC AUTO-ENTMCNC: 31.9 G/DL (ref 31–37)
MCV RBC AUTO: 92.6 FL (ref 78–102)
NEUTS SEG # BLD: 6.5 K/UL (ref 1.8–9.5)
NEUTS SEG NFR BLD AUTO: 76 % (ref 40–70)
PLATELET # BLD AUTO: 291 K/UL (ref 140–440)
RBC # BLD AUTO: 4.06 M/UL (ref 4.1–5.1)
VALID INTERNAL CONTROL?: YES
WBC # BLD AUTO: 8.6 K/UL (ref 4.5–13)

## 2017-07-05 NOTE — PROGRESS NOTES
Hematology/Oncology  Progress Note    Name: Inderjit Willis  Date: 2017  : 1935     Primary Care Provider: Margi Ortiz MD      Ms. Mart Salinas is a [de-identified]y.o. year old female who was seen for management of her thrombophilia. Current therapy: Coumadin 5 mg daily. Subjective:     Ms. Sheng Quispe is a 61-year-old woman who has an underlying thrombophilia disorder. She is on chronic anticoagulation therapy. Currently she is taking 5 mg daily. Her INR value today is 2.9. She has not experienced any shortness of breath, cough, bruising, or bleeding. She remained essentially wheelchair confined. She was denied home INR device per insurance. She comes to come to  the clinic Q 2 weeks to have her INR checked. The patient has chronic arthritic discomfort in her lower extremities as well. The past medical, surgical and social history has been reviewed and remains unchanged. Past Medical History:   Diagnosis Date    Diverticulosis     Hypercholesterolemia     Hypertension     Polio     Thromboembolus ([address]     Left leg, indefinite anticoag Dr. Olamide Roth NR goal 1.5-2     No past surgical history on file.   Social History     Social History    Marital status: UNKNOWN     Spouse name: N/A    Number of children: N/A    Years of education: N/A     Occupational History    n/a      Social History Main Topics    Smoking status: Never Smoker    Smokeless tobacco: Never Used    Alcohol use No    Drug use: Not on file    Sexual activity: Not Currently     Partners: Male     Other Topics Concern    Not on file     Social History Narrative     Family History   Problem Relation Age of Onset    Asthma Father      Current Outpatient Prescriptions   Medication Sig Dispense Refill    KLOR-CON M20 20 mEq tablet TAKE ONE TABLET BY MOUTH TWICE DAILY  60 Tab 0    pravastatin (PRAVACHOL) 40 mg tablet TAKE 1  TABLET BY MOUTH EVERY EVENING 90 Tab 3    valsartan-hydroCHLOROthiazide (DIOVAN-HCT) 160-12.5 mg per tablet TAKE 1 TABLET BY ORAL ROUTE ONCE DAILY 30 Tab 10    traMADol (ULTRAM) 50 mg tablet TAKE ONE TABLET BY MOUTH EVERY 6 HOURS AS NEEDED FOR PAIN 30 Tab 0    atenolol (TENORMIN) 50 mg tablet TAKE 1 TABLET (50 MG) BY ORAL ROUTE ONCE DAILY 90 Tab 3    warfarin (COUMADIN) 1 mg tablet Take 1-5 tablets by mouth daily pending the INR values 150 Tab 11    magnesium oxide (MAG-OX) 400 mg tablet Take 1 Tab by mouth daily. 90 Tab 3    ergocalciferol (ERGOCALCIFEROL) 50,000 unit capsule Take 1 Cap by mouth every seven (7) days. 12 Cap 3    warfarin (COUMADIN) 5 mg tablet TAKE 1 TABLET (5 MG) BY ORAL ROUTE ONCE DAILY 30 Tab 11    albuterol (PROVENTIL HFA, VENTOLIN HFA, PROAIR HFA) 90 mcg/actuation inhaler Take 2 Puffs by inhalation every four (4) hours as needed for Wheezing. 1 Inhaler 0    fluticasone (FLONASE) 50 mcg/actuation nasal spray 2 sprays each nostril once daily 1 Bottle 0    cetirizine (ZYRTEC) 10 mg tablet Take 1 Tab by mouth daily. 30 Tab 11    miscellaneous medical supply misc Please extend/provide mobile wheelchair  Dx: polio contracture 1 Each 0       Review of Systems  Constitutional: The patient has no acute distress or discomfort. HEENT: The patient denies recent head trauma, eye pain, blurred vision, hearing deficit, oropharyngeal mucosal pain or lesions, and the patient denies throat pain or discomfort. Lymphatics: The patient denies palpable peripheral lymphadenopathy. Hematologic: The patient denies having bruising, bleeding, or progressive fatigue. Respiratory: Patient denies having shortness of breath, cough, sputum production, fever, or dyspnea on exertion. Cardiovascular: The patient denies having leg pain, leg swelling, heart palpitations, chest pain, or lightheadedness. The patient denies having dyspnea on exertion. Gastrointestinal: The patient denies having nausea, emesis, or diarrhea. The patient denies having any hematemesis or blood in the stool.   Genitourinary: Patient denies having urinary urgency, frequency, or dysuria. The patient denies having blood in the urine. Psychological: The patient denies having symptoms of nervousness, anxiety, depression, or thoughts of harming himself some of this. Skin: Patient denies having skin rashes, skin, ulcerations, or unexplained itching or pruritus. Musculoskeletal: Occasional arthritic pain at multiple joints, not new. Wheelchair. Limited ROM at RLE 2' polio hx. Objective:     Visit Vitals    /72 (BP 1 Location: Right arm, BP Patient Position: Sitting)    Pulse (!) 52    Temp (!) 94.1 °F (34.5 °C) (Oral)    Ht 4' 11\" (1.499 m)    SpO2 93%     Pain Score:     Physical Exam:   Gen. Appearance: The patient is in no acute distress. Skin: There is no bruise or rash. HEENT: The exam is unremarkable. Neck: Supple without lymphadenopathy or thyromegaly. Lungs: Clear to auscultation and percussion; there are no wheezes or rhonchi. Heart: Regular rate and rhythm; there are no murmurs, gallops, or rubs. Anterior chest wall and breasts: Deferred. Abdomen: Bowel sounds are present and normal. There is no guarding, tenderness, or hepatosplenomegaly. Extremities: There is no clubbing, cyanosis, or edema. Neurologic: There are no focal neurologic deficits. Lymphatics: There is no palpable peripheral lymphadenopathy. Musculoskeletal: LROM and non-weight bearing at RLE. Otherwise FROM at all other ext's. Psychological/psychiatric: There is no clinical evidence of anxiety, depression, or melancholy.     Laboratory Data:     Results for orders placed or performed during the hospital encounter of 07/05/17   CBC WITH 3 PART DIFF     Status: Abnormal   Result Value Ref Range Status    WBC 8.6 4.5 - 13.0 K/uL Final    RBC 4.06 (L) 4.10 - 5.10 M/uL Final    HGB 12.0 12.0 - 16 g/dL Final    HCT 37.6 36 - 48 % Final    MCV 92.6 78 - 102 FL Final    MCH 29.6 25.0 - 35.0 PG Final    MCHC 31.9 31 - 37 g/dL Final    RDW 13.6 11.5 - 14.5 % Final PLATELET 280 818 - 421 K/uL Final    NEUTROPHILS 76 (H) 40 - 70 % Final    MIXED CELLS 8 0.1 - 17 % Final    LYMPHOCYTES 17 14 - 44 % Final    ABS. NEUTROPHILS 6.5 1.8 - 9.5 K/UL Final    ABS. MIXED CELLS 0.7 0.0 - 2.3 K/uL Final    ABS. LYMPHOCYTES 1.4 1.1 - 5.9 K/UL Final     Comment: Test performed at [place] [address] Location. Results Reviewed by Medical Director. 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 Thrombophilia ([facility]    2. Vitamin D deficiency    3. Primary osteoarthritis of both knees    4. Neutrophilia          Plan: Thrombophilia/DVT left lower extremity: I have explained to the patient that the current value of her INR is 3. I have recommended that she hold her Coumadin tonight and restart tomorrow at  4 mg. We will recheck her INR value in 2 weeks. The current CBC shows a stable WBC count of 8.6, hemoglobin is 12 g/dL, hematocrit is 37.6%, and the platelet count is 412,483. I will have her return to clinic for a complete reassessment again in 12 weeks. She will return to our clinic for INR check in 2 weeks. Vitamin D deficiency: The patient has previously been prescribed vitamin D supplementation. however she has not been compliant in taking the supplement. I have encourage her to start taking her Vitamin D supplement.  I have explained to the patient that she can continue to take over-the-counter vitamin D with calcium supplementation once she is done with this dose. At her next clinic visit in 12 weeks we will recheck her vitamin D levels. Primary osteoarthritis of both knees: The patient is currently taking tramadol 50 mg every 8 hours on a when necessary basis. I have advised the patient to also use Tylenol arthritis 2 tablets every 8 hours as needed. Since she is on long-term anticoagulation the patient was advised not to use over-the-counter NSAIDs such as aspirin, Motrin, or Aleve. These medications can increase the risk of bleeding again also impair renal function. Thrombophilia (new problem) I have explained to the patient and her neutrophil count is elevated to 76%. However the absolute neutrophil count remains normal at 6.5 with an absolute lymphocyte count of 1.4. We will monitor this at 12 week intervals. If there is persistent elevation in 12 weeks we will order flow cytometry for additional assessments. The patient will return to clinic for a follow-up visit in 4 months. Follow-up Disposition:  Return in about 4 months (around 11/5/2017). Orders Placed This Encounter    COMPLETE CBC & AUTO DIFF WBC    InHouse CBC ([employer])     Standing Status:   Future     Number of Occurrences:   1     Standing Expiration Date:   7/12/2017    VITAMIN D, 25 HYDROXY     Standing Status:   Future     Standing Expiration Date:   9/0/8286    METABOLIC PANEL, COMPREHENSIVE     Standing Status:   Future     Standing Expiration Date:   7/6/2018       Alistair Pitt MD  7/5/2017

## 2017-07-05 NOTE — PATIENT INSTRUCTIONS
Clotting Factor Deficiencies: Care Instructions  Your Care Instructions    Clotting factors are substances in the blood that help stop bleeding after a cut or injury. They also prevent sudden bleeding. In people who have clotting factor problems, the clotting factors don't work right or, in some cases, are missing. When blood does not clot well, even minor injuries can cause serious bleeding. This can lead to blood loss, injury to internal organs, or damage to muscles or joints. Several conditions, including hemophilia, can make it hard for the blood to clot. Your doctor can treat you by giving you replacement clotting factors. You also may take medicine to prevent bleeding. You may often have clotting factors transfused into a vein to prevent bleeding, or you may get them as needed. You may eventually learn to do this at home. You can also try to prevent injuries that can cause you to bleed. Follow-up care is a key part of your treatment and safety. Be sure to make and go to all appointments, and call your doctor if you are having problems. It's also a good idea to know your test results and keep a list of the medicines you take. How can you care for yourself at home? · Take your medicines exactly as prescribed. Call your doctor if you think you are having a problem with your medicine. You will get more details on the specific medicines your doctor prescribes. · Stay at a healthy body weight. If you are overweight, the additional stress on joints can trigger bleeding. · Exercise safely. Avoid contact sports. Swim or walk to avoid excess pressure on your joints. Check with your doctor before doing activities that put you at high risk for falls, such as riding a bike. · Brush and floss your teeth daily. This may help you avoid problems that could lead to having a tooth pulled. · Avoid aspirin and nonsteroidal anti-inflammatory drugs (NSAIDs), such as ibuprofen (Advil, Motrin) or naproxen (Aleve).  They can increase the chance of bleeding. · Take pain medicines exactly as directed. ¨ If the doctor gave you a prescription medicine for pain, take it as prescribed. ¨ If you are not taking a prescription pain medicine, ask your doctor if you can take an over-the-counter medicine. · Take care to prevent accidents at home:  ¨ Make sure rugs are tacked down so you do not slip. ¨ Keep furniture with sharp edges out of pathways. ¨ Use nonskid floor wax. ¨ Wipe up spills quickly. ¨ If you live in an area that gets snow and ice in the winter, sprinkle salt on steps and sidewalks. ¨ Avoid loose-fitting shoes. You might lose your balance and fall. · Wear medical alert jewelry that lists your clotting problem. You can buy this at most drugstores. When should you call for help? Call 911 anytime you think you may need emergency care. For example, call if:  · You passed out (lost consciousness). · You have a head injury. · You have sudden, severe pain, especially in a joint. · You have a sudden, severe headache that is different from past headaches. Call your doctor now or seek immediate medical care if:  · You are dizzy or lightheaded, or you feel like you may faint. · You are unable to stop bleeding by giving clotting factors after an injury. · You have an injury but are not sure whether you need treatment. · You have any abnormal bleeding, such as:  ¨ Nosebleeds. ¨ Vaginal bleeding that is different (heavier, more frequent, at a different time of the month) than what you are used to. ¨ Bloody or black stools, or rectal bleeding. ¨ Bloody or pink urine. Watch closely for changes in your health, and be sure to contact your doctor if:  · You have joint pain or swelling. Where can you learn more? Go to http://lawrence-yennifer.info/. Enter P530 in the search box to learn more about \"Clotting Factor Deficiencies: Care Instructions. \"  Current as of: October 13, 2016  Content Version: 11.3  © 2170-6922 Healthwise, Incorporated. Care instructions adapted under license by [employer] (which disclaims liability or warranty for this information). If you have questions about a medical condition or this instruction, always ask your healthcare professional. [place] [address] any warranty or liability for your use of this information.

## 2017-07-05 NOTE — MR AVS SNAPSHOT
Visit Information Date & Time Provider Department Dept. Phone Encounter #  
 7/5/2017  9:00 AM Carlos Enrique Garcia MD [facility] [phone] [id number] Follow-up Instructions Return in about 4 months (around 11/5/2017). Your Appointments 7/19/2017  9:15 AM  
Office Visit with Carlos Enrique Garcia MD  
[address] [facility] Appt Note: 2 WK RET  
 [address] [facility] [address] [place] [address] [employer] [place] [address] [facility]  
  
   
 [address] [facility] [place] [address] [facility] [address]  
  
    
 7/20/2017 11:00 AM  
ROUTINE CARE with Josh Rowley MD  
[facility] ([facility]) Appt Note: 2 month F/U  
 [address] [facility] [address]  
[address] [facility] [address] [facility] [address] Upcoming Health Maintenance Date Due  
 GLAUCOMA SCREENING Q2Y 4/9/2016 MEDICARE YEARLY EXAM 7/6/2017 INFLUENZA AGE 9 TO ADULT 8/1/2017 DTaP/Tdap/Td series (2 - Td) 7/5/2026 Allergies as of 7/5/2017  Review Complete On: 7/5/2017 By: Carlos Enrique Garcia MD  
  
 Severity Noted Reaction Type Reactions Lipitor [Atorvastatin]  07/22/2013   Side Effect Myalgia Current Immunizations  Reviewed on 9/22/2014 Name Date Influenza High Dose Vaccine PF 1/17/2017 Influenza Vaccine PF 9/22/2014 Pneumococcal Conjugate (PCV-13) 7/5/2016 Pneumococcal Polysaccharide (PPSV-23) 9/19/2013 Not reviewed this visit You Were Diagnosed With   
  
 Codes Comments [facility] ([facility]    -  Primary ICD-10-CM: P61.85 
ICD-9-CM: 289.81 Vitamin D deficiency     ICD-10-CM: E55.9 ICD-9-CM: 268.9 Primary osteoarthritis of both knees     ICD-10-CM: M17.0 ICD-9-CM: 715.16 Neutrophilia     ICD-10-CM: D72.9 ICD-9-CM: 730. 8 Vitals BP Pulse Temp Height(growth percentile) SpO2 OB Status 140/72 (BP 1 Location: Right arm, BP Patient Position: Sitting) (!) 52 (!) 94.1 °F (34.5 °C) (Oral) 4' 11\" (1.499 m) 93% Postmenopausal  
 Smoking Status Never Smoker Preferred Pharmacy Pharmacy Name Phone [id number] [address] [phone] Your Updated Medication List  
  
   
This list is accurate as of: 7/5/17 10:38 AM.  Always use your most recent med list.  
  
  
  
  
 albuterol 90 mcg/actuation inhaler Commonly known as:  PROVENTIL HFA, VENTOLIN HFA, PROAIR HFA Take 2 Puffs by inhalation every four (4) hours as needed for Wheezing. atenolol 50 mg tablet Commonly known as:  TENORMIN  
TAKE 1 TABLET (50 MG) BY ORAL ROUTE ONCE DAILY  
  
 cetirizine 10 mg tablet Commonly known as:  ZYRTEC Take 1 Tab by mouth daily. ergocalciferol 50,000 unit capsule Commonly known as:  ERGOCALCIFEROL Take 1 Cap by mouth every seven (7) days. fluticasone 50 mcg/actuation nasal spray Commonly known as:  FLONASE  
2 sprays each nostril once daily KLOR-CON M20 20 mEq tablet Generic drug:  potassium chloride TAKE ONE TABLET BY MOUTH TWICE DAILY  
  
 magnesium oxide 400 mg tablet Commonly known as:  MAG-OX Take 1 Tab by mouth daily. miscellaneous medical supply Misc Please extend/provide mobile wheelchair  Dx: polio contracture  
  
 pravastatin 40 mg tablet Commonly known as:  PRAVACHOL  
TAKE 1  TABLET BY MOUTH EVERY EVENING  
  
 traMADol 50 mg tablet Commonly known as:  ULTRAM  
TAKE ONE TABLET BY MOUTH EVERY 6 HOURS AS NEEDED FOR PAIN  
  
 valsartan-hydroCHLOROthiazide 160-12.5 mg per tablet Commonly known as:  DIOVAN-HCT  
TAKE 1 TABLET BY ORAL ROUTE ONCE DAILY * warfarin 5 mg tablet Commonly known as:  COUMADIN  
TAKE 1 TABLET (5 MG) BY ORAL ROUTE ONCE DAILY * warfarin 1 mg tablet Commonly known as:  COUMADIN  
 Take 1-5 tablets by mouth daily pending the INR values * Notice: This list has 2 medication(s) that are the same as other medications prescribed for you. Read the directions carefully, and ask your doctor or other care provider to review them with you. We Performed the Following COMPLETE CBC & AUTO DIFF WBC [62913 CPT(R)] Follow-up Instructions Return in about 4 months (around 11/5/2017). To-Do List   
 07/05/2017 Lab:  CBC WITH 3 PART DIFF   
  
 07/05/2017 Lab:  METABOLIC PANEL, COMPREHENSIVE   
  
 07/05/2017 Lab:  VITAMIN D, 25 HYDROXY Patient Instructions Clotting Factor Deficiencies: Care Instructions Your Care Instructions Clotting factors are substances in the blood that help stop bleeding after a cut or injury. They also prevent sudden bleeding. In people who have clotting factor problems, the clotting factors don't work right or, in some cases, are missing. When blood does not clot well, even minor injuries can cause serious bleeding. This can lead to blood loss, injury to internal organs, or damage to muscles or joints. Several conditions, including hemophilia, can make it hard for the blood to clot. Your doctor can treat you by giving you replacement clotting factors. You also may take medicine to prevent bleeding. You may often have clotting factors transfused into a vein to prevent bleeding, or you may get them as needed. You may eventually learn to do this at home. You can also try to prevent injuries that can cause you to bleed. Follow-up care is a key part of your treatment and safety. Be sure to make and go to all appointments, and call your doctor if you are having problems. It's also a good idea to know your test results and keep a list of the medicines you take. How can you care for yourself at home? · Take your medicines exactly as prescribed.  Call your doctor if you think you are having a problem with your medicine. You will get more details on the specific medicines your doctor prescribes. · Stay at a healthy body weight. If you are overweight, the additional stress on joints can trigger bleeding. · Exercise safely. Avoid contact sports. Swim or walk to avoid excess pressure on your joints. Check with your doctor before doing activities that put you at high risk for falls, such as riding a bike. · Brush and floss your teeth daily. This may help you avoid problems that could lead to having a tooth pulled. · Avoid aspirin and nonsteroidal anti-inflammatory drugs (NSAIDs), such as ibuprofen (Advil, Motrin) or naproxen (Aleve). They can increase the chance of bleeding. · Take pain medicines exactly as directed. ¨ If the doctor gave you a prescription medicine for pain, take it as prescribed. ¨ If you are not taking a prescription pain medicine, ask your doctor if you can take an over-the-counter medicine. · Take care to prevent accidents at home: ¨ Make sure rugs are tacked down so you do not slip. ¨ Keep furniture with sharp edges out of pathways. ¨ Use nonskid floor wax. ¨ Wipe up spills quickly. ¨ If you live in an area that gets snow and ice in the winter, sprinkle salt on steps and sidewalks. ¨ Avoid loose-fitting shoes. You might lose your balance and fall. · Wear medical alert jewelry that lists your clotting problem. You can buy this at most drugstores. When should you call for help? Call 911 anytime you think you may need emergency care. For example, call if: 
· You passed out (lost consciousness). · You have a head injury. · You have sudden, severe pain, especially in a joint. · You have a sudden, severe headache that is different from past headaches. Call your doctor now or seek immediate medical care if: 
· You are dizzy or lightheaded, or you feel like you may faint. · You are unable to stop bleeding by giving clotting factors after an injury. · You have an injury but are not sure whether you need treatment. · You have any abnormal bleeding, such as: 
¨ Nosebleeds. ¨ Vaginal bleeding that is different (heavier, more frequent, at a different time of the month) than what you are used to. ¨ Bloody or black stools, or rectal bleeding. ¨ Bloody or pink urine. Watch closely for changes in your health, and be sure to contact your doctor if: 
· You have joint pain or swelling. Where can you learn more? Go to http://lawrence-yennifer.info/. Enter E035 in the search box to learn more about \"Clotting Factor Deficiencies: Care Instructions. \" Current as of: October 13, 2016 Content Version: 11.3 © 4151-9883 [employer]. Care instructions adapted under license by [employer] (which disclaims liability or warranty for this information). If you have questions about a medical condition or this instruction, always ask your healthcare professional. [place] [address] any warranty or liability for your use of this information. Introducing [facility]! [employer] introduces [facility] patient portal. Now you can access parts of your medical record, email your doctor's office, and request medication refills online. 1. In your internet browser, go to [email or web address] 2. Click on the First Time User? Click Here link in the Sign In box. You will see the New Member Sign Up page. 3. Enter your [facility] Access Code exactly as it appears below. You will not need to use this code after youve completed the sign-up process. If you do not sign up before the expiration date, you must request a new code. · [facility] [employer] Access Code: [email or web address] Expires: 8/16/2017 11:56 AM 
 
4. Enter the last four digits of your Social Security Number (xxxx) and Date of Birth (mm/dd/yyyy) as indicated and click Submit. You will be taken to the next sign-up page. 5. Create a IroFit ID. This will be your IroFit login ID and cannot be changed, so think of one that is secure and easy to remember. 6. Create a IroFit password. You can change your password at any time. 7. Enter your Password Reset Question and Answer. This can be used at a later time if you forget your password. 8. Enter your e-mail address. You will receive e-mail notification when new information is available in [address] 9. Click Sign Up. You can now view and download portions of your medical record. 10. Click the Download Summary menu link to download a portable copy of your medical information. If you have questions, please visit the Frequently Asked Questions section of the IroFit website. Remember, IroFit is NOT to be used for urgent needs. For medical emergencies, dial 911. Now available from your iPhone and Android! Please provide this summary of care documentation to your next provider. Your primary care clinician is listed as Tigre Avila. If you have any questions after today's visit, please call [phone].

## 2017-07-06 LAB
25(OH)D3+25(OH)D2 SERPL-MCNC: 19.2 NG/ML (ref 30–100)
ALBUMIN SERPL-MCNC: 3.4 G/DL (ref 3.5–4.7)
ALBUMIN/GLOB SERPL: 1.1 {RATIO} (ref 1.2–2.2)
ALP SERPL-CCNC: 131 IU/L (ref 39–117)
ALT SERPL-CCNC: 5 IU/L (ref 0–32)
AST SERPL-CCNC: 13 IU/L (ref 0–40)
BILIRUB SERPL-MCNC: 0.3 MG/DL (ref 0–1.2)
BUN SERPL-MCNC: 14 MG/DL (ref 8–27)
BUN/CREAT SERPL: 29 (ref 12–28)
CALCIUM SERPL-MCNC: 8.8 MG/DL (ref 8.7–10.3)
CHLORIDE SERPL-SCNC: 96 MMOL/L (ref 96–106)
CO2 SERPL-SCNC: 30 MMOL/L (ref 18–29)
CREAT SERPL-MCNC: 0.49 MG/DL (ref 0.57–1)
GLOBULIN SER CALC-MCNC: 3.1 G/DL (ref 1.5–4.5)
GLUCOSE SERPL-MCNC: 82 MG/DL (ref 65–99)
POTASSIUM SERPL-SCNC: 4.8 MMOL/L (ref 3.5–5.2)
PROT SERPL-MCNC: 6.5 G/DL (ref 6–8.5)
SODIUM SERPL-SCNC: 138 MMOL/L (ref 134–144)

## 2017-07-06 NOTE — PROGRESS NOTES
Patient was not compliant with taking her Vitamin D supplementation. She will once again restart the medication. Will continue to monitor.

## 2017-07-20 ENCOUNTER — OFFICE VISIT (OUTPATIENT)
Dept: FAMILY MEDICINE CLINIC | Age: 82
End: 2017-07-20

## 2017-07-20 VITALS
DIASTOLIC BLOOD PRESSURE: 70 MMHG | HEART RATE: 59 BPM | TEMPERATURE: 97.8 F | RESPIRATION RATE: 16 BRPM | OXYGEN SATURATION: 98 % | HEIGHT: 59 IN | SYSTOLIC BLOOD PRESSURE: 126 MMHG

## 2017-07-20 DIAGNOSIS — J30.2 SEASONAL ALLERGIC RHINITIS, UNSPECIFIED ALLERGIC RHINITIS TRIGGER: ICD-10-CM

## 2017-07-20 DIAGNOSIS — I10 ESSENTIAL HYPERTENSION: Primary | ICD-10-CM

## 2017-07-20 DIAGNOSIS — E55.9 VITAMIN D DEFICIENCY: ICD-10-CM

## 2017-07-20 DIAGNOSIS — A80.9 POLIO: ICD-10-CM

## 2017-07-20 DIAGNOSIS — Z13.39 SCREENING FOR ALCOHOLISM: ICD-10-CM

## 2017-07-20 DIAGNOSIS — Z00.00 ROUTINE GENERAL MEDICAL EXAMINATION AT A HEALTH CARE FACILITY: ICD-10-CM

## 2017-07-20 DIAGNOSIS — Z71.89 ADVANCE CARE PLANNING: ICD-10-CM

## 2017-07-20 RX ORDER — TRAMADOL HYDROCHLORIDE 50 MG/1
TABLET ORAL
Qty: 60 TAB | Refills: 0 | Status: SHIPPED | OUTPATIENT
Start: 2017-07-20

## 2017-07-20 NOTE — MR AVS SNAPSHOT
Visit Information Date & Time Provider Department Dept. Phone Encounter #  
 7/20/2017 11:00 AM Siena Nguyen, [address] [id number] Your Appointments 7/26/2017  9:20 AM  
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  
 [address], [address] [place] [address] Upcoming Health Maintenance Date Due  
 GLAUCOMA SCREENING Q2Y 4/9/2016 INFLUENZA AGE 9 TO ADULT 8/1/2017 MEDICARE YEARLY EXAM 7/21/2018 DTaP/Tdap/Td series (2 - Td) 7/5/2026 Allergies as of 7/20/2017  Review Complete On: 7/20/2017 By: Verónica Jackson LPN Severity Noted Reaction Type Reactions Lipitor [Atorvastatin]  07/22/2013   Side Effect Myalgia Current Immunizations  Reviewed on 9/22/2014 Name Date Influenza High Dose Vaccine PF 1/17/2017 Influenza Vaccine PF 9/22/2014 Pneumococcal Conjugate (PCV-13) 7/5/2016 Pneumococcal Polysaccharide (PPSV-23) 9/19/2013 Not reviewed this visit You Were Diagnosed With   
  
 Codes Comments Essential hypertension    -  Primary ICD-10-CM: I10 
ICD-9-CM: 401.9 Routine general medical examination at a health care facility     ICD-10-CM: Z00.00 ICD-9-CM: V70.0 Screening for alcoholism     ICD-10-CM: Z13.89 ICD-9-CM: V79.1 Polio     ICD-10-CM: A80.9 ICD-9-CM: 045.90 Vitamin D deficiency     ICD-10-CM: E55.9 ICD-9-CM: 268.9 Seasonal allergic rhinitis, unspecified allergic rhinitis trigger     ICD-10-CM: J30.2 ICD-9-CM: 477.9 Vitals BP Pulse Temp Resp Height(growth percentile) SpO2  
 126/70 (BP 1 Location: Left arm, BP Patient Position: Sitting) (!) 59 97.8 °F (36.6 °C) (Oral) 16 4' 11\" (1.499 m) 98% OB Status Smoking Status Postmenopausal Never Smoker Preferred Pharmacy Pharmacy Name Phone [id number] - [address] [phone] Your Updated Medication List  
  
   
This list is accurate as of: 7/20/17 11:17 AM.  Always use your most recent med list.  
  
  
  
  
 albuterol 90 mcg/actuation inhaler Commonly known as:  PROVENTIL HFA, VENTOLIN HFA, PROAIR HFA Take 2 Puffs by inhalation every four (4) hours as needed for Wheezing. atenolol 50 mg tablet Commonly known as:  TENORMIN  
TAKE 1 TABLET (50 MG) BY ORAL ROUTE ONCE DAILY  
  
 cetirizine 10 mg tablet Commonly known as:  ZYRTEC Take 1 Tab by mouth daily. ergocalciferol 50,000 unit capsule Commonly known as:  ERGOCALCIFEROL Take 1 Cap by mouth every seven (7) days. fluticasone 50 mcg/actuation nasal spray Commonly known as:  FLONASE  
2 sprays each nostril once daily KLOR-CON M20 20 mEq tablet Generic drug:  potassium chloride TAKE ONE TABLET BY MOUTH TWICE DAILY  
  
 magnesium oxide 400 mg tablet Commonly known as:  MAG-OX Take 1 Tab by mouth daily. miscellaneous medical supply Misc Please extend/provide mobile wheelchair  Dx: polio contracture  
  
 pravastatin 40 mg tablet Commonly known as:  PRAVACHOL  
TAKE 1  TABLET BY MOUTH EVERY EVENING  
  
 traMADol 50 mg tablet Commonly known as:  ULTRAM  
TAKE ONE TABLET BY MOUTH EVERY 6 HOURS AS NEEDED FOR PAIN  
  
 valsartan-hydroCHLOROthiazide 160-12.5 mg per tablet Commonly known as:  DIOVAN-HCT  
TAKE 1 TABLET BY ORAL ROUTE ONCE DAILY * warfarin 5 mg tablet Commonly known as:  COUMADIN  
TAKE 1 TABLET (5 MG) BY ORAL ROUTE ONCE DAILY * warfarin 1 mg tablet Commonly known as:  COUMADIN Take 1-5 tablets by mouth daily pending the INR values * Notice: This list has 2 medication(s) that are the same as other medications prescribed for you.  Read the directions carefully, and ask your doctor or other care provider to review them with you. Prescriptions Printed Refills  
 traMADol (ULTRAM) 50 mg tablet 0 Sig: TAKE ONE TABLET BY MOUTH EVERY 6 HOURS AS NEEDED FOR PAIN Class: Print Patient Instructions Medicare Wellness Visit, Female The best way to improve and maintain good health is to have a healthy lifestyle by eating a well-balanced diet, exercising regularly, limiting alcohol and stopping smoking. Regular visits with your physician or non-physician health care provider also support your good health. Preventive screening tests can find health problems before they become diseases or illnesses. Preventive services such as immunizations prevent serious infections. All people over age 72 should have a Pneumovax and a Prevnar-13 shot to prevent potentially life threatening infections with the pneumococcus bacteria, a common cause of pneumonia. These are once in a lifetime unless you and your provider decide differently. All people over 65 should have a yearly influenza vaccine or \"flu\" shot. This does not prevent infection with cold viruses but has been proven to prevent hospitalization and death from influenza. Although Medicare part B \"regular Medicare\" currently only covers tetanus vaccination in the context of an injury, a tetanus vaccine (Tdap or Td) is recommended every 10 years. A shingles vaccine is recommended once in a lifetime after age 61. The Shingles vaccine is also not covered by Medicare part B. Note, however, that both the Shingles vaccine and Tdap/Td are generally covered by secondary carriers. Please check your coverage and out of pocket expenses. Consider contacting your local health department because it may stock these vaccines for a reasonable charge. We currently have documentation of the following immunization history for you: 
Immunization History Administered Date(s) Administered  Influenza High Dose Vaccine PF 01/17/2017  Influenza Vaccine PF 09/22/2014  Pneumococcal Conjugate (PCV-13) 07/05/2016  Pneumococcal Polysaccharide (PPSV-23) 09/19/2013 Screening for infection with Hepatitis C is recommended for anyone born between 80 through [address]. The table at the bottom of this document indicates the status of this and other preventive services. A bone mass density test (DEXA) to screen for osteoporosis or thinning of the bones should be done at least once after age 72 and may be done up to every 2 years as determined by you and your health care provider. The most recent DEXA we have on file for you is: DEXA Results (most recent): No results found for this or any previous visit. Screening for diabetes mellitus with a blood sugar test (glucose) should be done at least every 3 years until age 79. You and your health care provider may decide whether to continue screening after age 79. The most recent blood glucose we have on file for you is:  
Lab Results Component Value Date/Time Glucose 82 07/05/2017 10:15 AM  
 
 
 
Glaucoma is a disease of the eye due to increased ocular pressure that can lead to blindness. People with risk factors for glaucoma ( race, diabetes, family history) should consider screening at least every 2 years by an eye professional.  
 
Cardiovascular screening tests that check for elevated lipids or cholesterol (fatty part of blood) which can lead to heart disease and strokes should be done every 4-6 years through age 79. You and your health care provider may decide whether to continue screening after age 79. The most recent lipid panel we have on file for you is:  
Lab Results Component Value Date/Time  Cholesterol, total 122 07/05/2016 03:05 AM  
 HDL Cholesterol 40 07/05/2016 03:05 AM  
 LDL, calculated 67 07/05/2016 03:05 AM  
 VLDL, calculated 15 07/05/2016 03:05 AM  
 Triglyceride 74 07/05/2016 03:05 AM  
 
 
 Colorectal cancer screening that evaluates for blood or polyps in your colon for people with average risk should be done yearly as a stool test, every five years as a flexible sigmoidoscope or every 10 years as a colonoscopy up to age 76. You and your health care provider may decide whether to continue screening after age 76. Breast cancer screening with a mammogram is recommended at least once every 2 years  for women age 54-69. You and your health care provider may decide whether to continue screening after age 76. The most recent mammogram we have on file for you is: [facility] Results (most recent): No results found for this or any previous visit. Screening for cervical cancer with a pap smear is recommended for all women with a cervix until age 72. The frequency of this test is based on the details of her prior pap smear testing. You and your health care provider may decide whether to continue screening after age 72. People who have smoked the equivalent of 1 pack per day for 30 years or more may benefit from screening for lung cancer with a yearly low dose CT scan until they have been non smokers for 15 years or competing health conditions render this unlikely to be beneficial. 
Your Medicare Wellness Exam is recommended annually. Here is a list of your current Health Maintenance items with a due date: 
Health Maintenance Topic Date Due  GLAUCOMA SCREENING Q2Y  04/09/2016  INFLUENZA AGE 9 TO ADULT  08/01/2017  MEDICARE YEARLY EXAM  07/21/2018  DTaP/Tdap/Td series (2 - Td) 07/05/2026  
 OSTEOPOROSIS SCREENING (DEXA)  Addressed  ZOSTER VACCINE AGE 60>  Addressed  Pneumococcal 65+ High/Highest Risk  Completed Introducing [facility]! [facility] introduces teextee patient portal. Now you can access parts of your medical record, email your doctor's office, and request medication refills online.    
 
1. In your internet browser, go to [email or web address] 2. Click on the First Time User? Click Here link in the Sign In box. You will see the New Member Sign Up page. 3. Enter your [employer] [facility] Access Code exactly as it appears below. You will not need to use this code after youve completed the sign-up process. If you do not sign up before the expiration date, you must request a new code. · [employer] [facility] Access Code: [email or web address] Expires: 8/16/2017 11:56 AM 
 
4. Enter the last four digits of your Social Security Number (xxxx) and Date of Birth (mm/dd/yyyy) as indicated and click Submit. You will be taken to the next sign-up page. 5. Create a [employer] ID. This will be your [employer] [facility] login ID and cannot be changed, so think of one that is secure and easy to remember. 6. Create a [employer] [facility] password. You can change your password at any time. 7. Enter your Password Reset Question and Answer. This can be used at a later time if you forget your password. 8. Enter your e-mail address. You will receive e-mail notification when new information is available in [address] 9. Click Sign Up. You can now view and download portions of your medical record. 10. Click the Download Summary menu link to download a portable copy of your medical information. If you have questions, please visit the Frequently Asked Questions section of the [employer] [facility] website. Remember, [facility] [facility] is NOT to be used for urgent needs. For medical emergencies, dial 911. Now available from your iPhone and Android! Please provide this summary of care documentation to your next provider. Your primary care clinician is listed as Ana Donald. If you have any questions after today's visit, please call [phone].

## 2017-07-20 NOTE — PATIENT INSTRUCTIONS
Medicare Wellness Visit, Female    The best way to improve and maintain good health is to have a healthy lifestyle by eating a well-balanced diet, exercising regularly, limiting alcohol and stopping smoking. Regular visits with your physician or non-physician health care provider also support your good health. Preventive screening tests can find health problems before they become diseases or illnesses. Preventive services such as immunizations prevent serious infections. All people over age 72 should have a Pneumovax and a Prevnar-13 shot to prevent potentially life threatening infections with the pneumococcus bacteria, a common cause of pneumonia. These are once in a lifetime unless you and your provider decide differently. All people over 65 should have a yearly influenza vaccine or \"flu\" shot. This does not prevent infection with cold viruses but has been proven to prevent hospitalization and death from influenza. Although Medicare part B \"regular Medicare\" currently only covers tetanus vaccination in the context of an injury, a tetanus vaccine (Tdap or Td) is recommended every 10 years. A shingles vaccine is recommended once in a lifetime after age 61. The Shingles vaccine is also not covered by Medicare part B. Note, however, that both the Shingles vaccine and Tdap/Td are generally covered by secondary carriers. Please check your coverage and out of pocket expenses. Consider contacting your local health department because it may stock these vaccines for a reasonable charge.     We currently have documentation of the following immunization history for you:  Immunization History   Administered Date(s) Administered    Influenza High Dose Vaccine PF 01/17/2017    Influenza Vaccine PF 09/22/2014    Pneumococcal Conjugate (PCV-13) 07/05/2016    Pneumococcal Polysaccharide (PPSV-23) 09/19/2013       Screening for infection with Hepatitis C is recommended for anyone born between [birth year] through 1963.The table at the bottom of this document indicates the status of this and other preventive services. A bone mass density test (DEXA) to screen for osteoporosis or thinning of the bones should be done at least once after age 72 and may be done up to every 2 years as determined by you and your health care provider. The most recent DEXA we have on file for you is:  DEXA Results (most recent):  No results found for this or any previous visit. Screening for diabetes mellitus with a blood sugar test (glucose) should be done at least every 3 years until age 79. You and your health care provider may decide whether to continue screening after age 79. The most recent blood glucose we have on file for you is:   Lab Results   Component Value Date/Time    Glucose 82 07/05/2017 10:15 AM         Glaucoma is a disease of the eye due to increased ocular pressure that can lead to blindness. People with risk factors for glaucoma ( race, diabetes, family history) should consider screening at least every 2 years by an eye professional.     Cardiovascular screening tests that check for elevated lipids or cholesterol (fatty part of blood) which can lead to heart disease and strokes should be done every 4-6 years through age 79. You and your health care provider may decide whether to continue screening after age 79. The most recent lipid panel we have on file for you is:   Lab Results   Component Value Date/Time    Cholesterol, total 122 07/05/2016 03:05 AM    HDL Cholesterol 40 07/05/2016 03:05 AM    LDL, calculated 67 07/05/2016 03:05 AM    VLDL, calculated 15 07/05/2016 03:05 AM    Triglyceride 74 07/05/2016 03:05 AM       Colorectal cancer screening that evaluates for blood or polyps in your colon for people with average risk should be done yearly as a stool test, every five years as a flexible sigmoidoscope or every 10 years as a colonoscopy up to age 76.  You and your health care provider may decide whether to continue screening after age 76. Breast cancer screening with a mammogram is recommended at least once every 2 years  for women age 54-69. You and your health care provider may decide whether to continue screening after age 76. The most recent mammogram we have on file for you is:   ISIS Results (most recent):  No results found for this or any previous visit. Screening for cervical cancer with a pap smear is recommended for all women with a cervix until age 72. The frequency of this test is based on the details of her prior pap smear testing. You and your health care provider may decide whether to continue screening after age 72. People who have smoked the equivalent of 1 pack per day for 30 years or more may benefit from screening for lung cancer with a yearly low dose CT scan until they have been non smokers for 15 years or competing health conditions render this unlikely to be beneficial.  Your Medicare Wellness Exam is recommended annually.     Here is a list of your current Health Maintenance items with a due date:  Health Maintenance   Topic Date Due    GLAUCOMA SCREENING Q2Y  04/09/2016    INFLUENZA AGE 9 TO ADULT  08/01/2017    MEDICARE YEARLY EXAM  07/21/2018    DTaP/Tdap/Td series (2 - Td) 07/05/2026    OSTEOPOROSIS SCREENING (DEXA)  Addressed    ZOSTER VACCINE AGE 60>  Addressed    Pneumococcal 65+ High/Highest Risk  Completed

## 2017-07-20 NOTE — ACP (ADVANCE CARE PLANNING)
Advance Care Planning (ACP) Provider Conversation Snapshot    Date of ACP Conversation: 07/20/17  Persons included in Conversation:  patient and family  Length of ACP Conversation in minutes:  16 minutes    Authorized Decision Maker (if patient is incapable of making informed decisions): This person is:   has yet to decide, likely daughter. advised to complete form with her. For Patients with Decision Making Capacity:   Values/Goals: Exploration of values, goals, and preferences if recovery is not expected, even with continued medical treatment in the event of:  Imminent death  Severe, permanent brain injury  \"In these circumstances, what matters most to you? \"  Other: undecided    Conversation Outcomes / Follow-Up Plan:   Recommended completion of Advance Directive form after review of ACP materials and conversation with prospective healthcare agent   Recommended communicating the plan and making copies for the healthcare agent, personal physician, and others as appropriate (e.g., health system)  Recommended review of completed ACP document annually or upon change in health status

## 2017-07-20 NOTE — PROGRESS NOTES
This is a Subsequent Medicare Annual Wellness Visit providing Personalized Prevention Plan Services (PPPS) (Performed 12 months after initial AWV and PPPS )    I have reviewed the patient's medical history in detail and updated the computerized patient record. History     Past Medical History:   Diagnosis Date    Diverticulosis     Hypercholesterolemia     Hypertension     Polio     Thromboembolus ([address] 2012    Left leg, indefinite anticoag Dr. Tricia Sanford NR goal 1.5-2      No past surgical history on file. Current Outpatient Prescriptions   Medication Sig Dispense Refill    KLOR-CON M20 20 mEq tablet TAKE ONE TABLET BY MOUTH TWICE DAILY  60 Tab 0    pravastatin (PRAVACHOL) 40 mg tablet TAKE 1  TABLET BY MOUTH EVERY EVENING 90 Tab 3    valsartan-hydroCHLOROthiazide (DIOVAN-HCT) 160-12.5 mg per tablet TAKE 1 TABLET BY ORAL ROUTE ONCE DAILY 30 Tab 10    traMADol (ULTRAM) 50 mg tablet TAKE ONE TABLET BY MOUTH EVERY 6 HOURS AS NEEDED FOR PAIN 30 Tab 0    atenolol (TENORMIN) 50 mg tablet TAKE 1 TABLET (50 MG) BY ORAL ROUTE ONCE DAILY 90 Tab 3    warfarin (COUMADIN) 1 mg tablet Take 1-5 tablets by mouth daily pending the INR values 150 Tab 11    magnesium oxide (MAG-OX) 400 mg tablet Take 1 Tab by mouth daily. 90 Tab 3    ergocalciferol (ERGOCALCIFEROL) 50,000 unit capsule Take 1 Cap by mouth every seven (7) days. 12 Cap 3    warfarin (COUMADIN) 5 mg tablet TAKE 1 TABLET (5 MG) BY ORAL ROUTE ONCE DAILY 30 Tab 11    albuterol (PROVENTIL HFA, VENTOLIN HFA, PROAIR HFA) 90 mcg/actuation inhaler Take 2 Puffs by inhalation every four (4) hours as needed for Wheezing. 1 Inhaler 0    fluticasone (FLONASE) 50 mcg/actuation nasal spray 2 sprays each nostril once daily 1 Bottle 0    cetirizine (ZYRTEC) 10 mg tablet Take 1 Tab by mouth daily.  30 Tab 11    miscellaneous medical supply misc Please extend/provide mobile wheelchair  Dx: polio contracture 1 Each 0     Allergies   Allergen Reactions    Lipitor [Atorvastatin] Myalgia     Family History   Problem Relation Age of Onset    Asthma Father      Social History   Substance Use Topics    Smoking status: Never Smoker    Smokeless tobacco: Never Used    Alcohol use No     Patient Active Problem List   Diagnosis Code    Polio A80.9    Hypertension I10    Morbid obesity ([facility] E66.01    Hyperlipidemia with target LDL less than 130 E78.5    Thrombophilia ([facility] D68.59    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    Ventral hernia K43.9    Allergic rhinitis due to allergen J30.9    Primary osteoarthritis of both knees M17.0    Advance directive discussed with patient Z70.80    Vitamin D deficiency E55.9    Hypomagnesemia E83.42    Neutrophilia D72.9       Depression Risk Factor Screening:     PHQ over the last two weeks 5/18/2017   Little interest or pleasure in doing things Not at all   Feeling down, depressed or hopeless Not at all   Total Score PHQ 2 0     Alcohol Risk Factor Screening:   None per patient report      Functional Ability and Level of Safety:     Hearing Loss   none    Activities of Daily Living   Partial assistance. Requires assistance with: ambulation, bathing and hygiene, continence, grooming, toileting and dressing    Fall Risk     Fall Risk Assessment, last 12 mths 5/18/2017   Able to walk? No     Abuse Screen   Patient is not abused    Review of Systems   Pertinent items are noted in HPI. Physical Examination     Evaluation of Cognitive Function:  Mood/affect:  happy  Appearance: age appropriate and casually dressed  Family member/caregiver input: Daughter reports that she is doing good for her age.       Patient Care Team:  Ralf Leary MD as PCP - General (Family Practice)  Stan Pratt MD (Gastroenterology)  Jerzy Elizabeth MD (Oncology)  Phoebe Mazariegos OD (Optometry)  Mai Myers MD (Psychiatry)    Advice/Referrals/Counseling   Education and counseling provided:  Are appropriate based on today's review and evaluation  End-of-Life planning (with patient's consent)-discussed, provided form  Pneumococcal Vaccine- complete 13/23  Bone mass measurement (DEXA)-pt declines checks  Screening for glaucoma-due,  has yet to schedule appt, reminded      Assessment/Plan   reviewed diet, exercise and weight control. Diane Gomez, [de-identified] y.o.,  female    SUBJECTIVE  Routine ff-up    HTN- taking medications without problems, denies cp, sob, leg edema. Inconsistent with diet, she drinks mountain dew regularly, advised to cut down. HL- on pravachol    Continues to follow heme dr. Mariana Rizzo for chronic anticoag for h/o dvt    Allergic rhinitis- asymptomatic this summer     bilateral knee pain/OA-takes tramadol about every day.      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    Ventral hernia K43.9    Allergic rhinitis due to allergen J30.9    Primary osteoarthritis of both knees M17.0    Advance directive discussed with patient Z70.80    Vitamin D deficiency E55.9    Hypomagnesemia E83.42    Neutrophilia D72.9       Current Outpatient Prescriptions   Medication Sig Dispense Refill    traMADol (ULTRAM) 50 mg tablet TAKE ONE TABLET BY MOUTH EVERY 6 HOURS AS NEEDED FOR PAIN 60 Tab 0    KLOR-CON M20 20 mEq tablet TAKE ONE TABLET BY MOUTH TWICE DAILY  60 Tab 0    pravastatin (PRAVACHOL) 40 mg tablet TAKE 1  TABLET BY MOUTH EVERY EVENING 90 Tab 3    valsartan-hydroCHLOROthiazide (DIOVAN-HCT) 160-12.5 mg per tablet TAKE 1 TABLET BY ORAL ROUTE ONCE DAILY 30 Tab 10    atenolol (TENORMIN) 50 mg tablet TAKE 1 TABLET (50 MG) BY ORAL ROUTE ONCE DAILY 90 Tab 3    warfarin (COUMADIN) 1 mg tablet Take 1-5 tablets by mouth daily pending the INR values 150 Tab 11    warfarin (COUMADIN) 5 mg tablet TAKE 1 TABLET (5 MG) BY ORAL ROUTE ONCE DAILY 30 Tab 11    albuterol (PROVENTIL HFA, VENTOLIN HFA, PROAIR HFA) 90 mcg/actuation inhaler Take 2 Puffs by inhalation every four (4) hours as needed for Wheezing. 1 Inhaler 0    miscellaneous medical supply misc Please extend/provide mobile wheelchair  Dx: polio contracture 1 Each 0    magnesium oxide (MAG-OX) 400 mg tablet Take 1 Tab by mouth daily. 90 Tab 3    ergocalciferol (ERGOCALCIFEROL) 50,000 unit capsule Take 1 Cap by mouth every seven (7) days. 12 Cap 3    fluticasone (FLONASE) 50 mcg/actuation nasal spray 2 sprays each nostril once daily 1 Bottle 0    cetirizine (ZYRTEC) 10 mg tablet Take 1 Tab by mouth daily.  30 Tab 11       Allergies   Allergen Reactions    Lipitor [Atorvastatin] Myalgia       Past Medical History:   Diagnosis Date    Diverticulosis     Hypercholesterolemia     Hypertension     Polio     Thromboembolus ([address] 2012    Left leg, indefinite anticoag Dr. Yakov Dan NR goal 1.5-2       Social History     Social History    Marital status:      Spouse name: N/A    Number of children: N/A    Years of education: N/A     Occupational History    n/a      Social History Main Topics    Smoking status: Never Smoker    Smokeless tobacco: Never Used    Alcohol use No    Drug use: Not on file    Sexual activity: Not Currently     Partners: Male     Other Topics Concern    Not on file     Social History Narrative       Family History   Problem Relation Age of Onset    Asthma Father          OBJECTIVE    Physical Exam:     Visit Vitals    /70 (BP 1 Location: Left arm, BP Patient Position: Sitting)    Pulse (!) 59    Temp 97.8 °F (36.6 °C) (Oral)    Resp 16    Ht 4' 11\" (1.499 m)    SpO2 98%       General: alert, wheelchair , in no apparent distress or pain  CVS: normal rate, regular rhythm  Lungs:clear to ausculation bilaterally, no crackles, wheezing or rhonchi noted  Extremities:  R leg atrophy  Skin: warm, no lesions, rashes noted  Psych:  mood and affect normal      ASSESSMENT/PLAN  Alberto Harding was seen today for hypertension and cholesterol problem. Diagnoses and all orders for this visit:    Essential hypertension-controlled,   cont diovan+ KCL, BB    Polio    Hyperlipidemia   Good range on pravachol     Primary osteoarthritis of both knees-persistent  Cont prn tramadol, c/w . Wt loss reiterated, avoid sodas    Vitamin D deficiency  H/o mild, inconsistent with po replacement, reminded    Allergic rhinitis  Cont prn zyrtec/flonase    Ff-up in 5 months or sooner prn    Patient understands plan of care. Patient has provided input and agrees with goals.

## 2017-07-26 ENCOUNTER — ANTI-COAG VISIT (OUTPATIENT)
Dept: ONCOLOGY | Age: 82
End: 2017-07-26

## 2017-07-26 DIAGNOSIS — I82.409 THROMBOEMBOLISM OF DEEP VEINS OF LOWER EXTREMITY, UNSPECIFIED LATERALITY (HCC): Primary | ICD-10-CM

## 2017-07-26 DIAGNOSIS — D68.59 THROMBOPHILIA (HCC): ICD-10-CM

## 2017-07-27 LAB
INR BLD: 2.4 (ref 2–3)
VALID INTERNAL CONTROL?: YES

## 2017-08-09 ENCOUNTER — ANTI-COAG VISIT (OUTPATIENT)
Dept: ONCOLOGY | Age: 82
End: 2017-08-09

## 2017-08-09 DIAGNOSIS — I82.409 THROMBOEMBOLISM OF DEEP VEINS OF LOWER EXTREMITY, UNSPECIFIED LATERALITY (HCC): Primary | ICD-10-CM

## 2017-08-09 DIAGNOSIS — D68.59 THROMBOPHILIA (HCC): ICD-10-CM

## 2017-08-09 LAB
INR BLD: 2.5 (ref 2–3)
VALID INTERNAL CONTROL?: YES

## 2017-08-10 NOTE — TELEPHONE ENCOUNTER
This patient contacted office for the following prescriptions to be filled:    Medication requested :   Requested Prescriptions     Pending Prescriptions Disp Refills    potassium chloride (KLOR-CON M20) 20 mEq tablet 60 Tab 0     PCP: [address] [place] [address] or Print: Noah Gutierrez  Mail order or Local pharmacy [facility]     Scheduled appointment if not seen by current providers in office: LOV 7/20/2017 f/u 12/21/2017

## 2017-08-14 RX ORDER — POTASSIUM CHLORIDE 1500 MG/1
TABLET, EXTENDED RELEASE ORAL
Qty: 60 TAB | Refills: 0 | Status: SHIPPED | OUTPATIENT
Start: 2017-08-14 | End: 2017-10-11 | Stop reason: SDUPTHER

## 2017-08-14 NOTE — TELEPHONE ENCOUNTER
Last OV 07/20/2017  Next OV 12/21/2017  Last CMP 07/05/2017  K+ 4.8  Last refilled 06/07/2017  60 tablets   0 refills

## 2017-08-15 RX ORDER — POTASSIUM CHLORIDE 20 MEQ/1
20 TABLET, EXTENDED RELEASE ORAL 2 TIMES DAILY
Qty: 60 TAB | Refills: 0 | Status: CANCELLED | OUTPATIENT
Start: 2017-08-15

## 2017-08-23 ENCOUNTER — ANTI-COAG VISIT (OUTPATIENT)
Dept: ONCOLOGY | Age: 82
End: 2017-08-23

## 2017-08-23 VITALS — SYSTOLIC BLOOD PRESSURE: 133 MMHG | DIASTOLIC BLOOD PRESSURE: 66 MMHG | TEMPERATURE: 96.9 F | HEART RATE: 59 BPM

## 2017-08-23 DIAGNOSIS — I82.409 THROMBOEMBOLISM OF DEEP VEINS OF LOWER EXTREMITY, UNSPECIFIED LATERALITY (HCC): Primary | ICD-10-CM

## 2017-08-23 LAB
INR BLD: 2.9 (ref 2–3)
VALID INTERNAL CONTROL?: YES

## 2017-08-24 ENCOUNTER — TELEPHONE (OUTPATIENT)
Dept: ONCOLOGY | Age: 82
End: 2017-08-24

## 2017-08-24 NOTE — TELEPHONE ENCOUNTER
Can you please call this pt. She states she needs a tooth pulled and she is on Coumadin. She wanted to know if she needed to come off the Coumadin before she gets the tooth pulled. Please call and advise.

## 2017-08-29 ENCOUNTER — DOCUMENTATION ONLY (OUTPATIENT)
Dept: ONCOLOGY | Age: 82
End: 2017-08-29

## 2017-08-29 RX ORDER — WARFARIN 1 MG/1
TABLET ORAL
Qty: 150 TAB | Refills: 11 | Status: SHIPPED | OUTPATIENT
Start: 2017-08-29 | End: 2018-01-01 | Stop reason: SDUPTHER

## 2017-08-29 NOTE — PROGRESS NOTES
Pt called regarding coumadin dose in preparation for tooth extraction. I instructed the patient to hold coumadin for 7 days prior to extraction and then restart coumadin the same dose the day after the extraction.

## 2017-08-30 ENCOUNTER — ANTI-COAG VISIT (OUTPATIENT)
Dept: ONCOLOGY | Age: 82
End: 2017-08-30

## 2017-08-30 VITALS — SYSTOLIC BLOOD PRESSURE: 128 MMHG | TEMPERATURE: 96.6 F | DIASTOLIC BLOOD PRESSURE: 67 MMHG | HEART RATE: 56 BPM

## 2017-08-30 DIAGNOSIS — I82.409 THROMBOEMBOLISM OF DEEP VEINS OF LOWER EXTREMITY, UNSPECIFIED LATERALITY (HCC): Primary | ICD-10-CM

## 2017-08-30 LAB
INR BLD: 2.9 (ref 2–3)
VALID INTERNAL CONTROL?: YES

## 2017-09-06 RX ORDER — ATENOLOL 50 MG/1
TABLET ORAL
Qty: 90 TAB | Refills: 0 | Status: SHIPPED | OUTPATIENT
Start: 2017-09-06 | End: 2017-12-06 | Stop reason: SDUPTHER

## 2017-09-11 ENCOUNTER — TELEPHONE (OUTPATIENT)
Dept: FAMILY MEDICINE CLINIC | Age: 82
End: 2017-09-11

## 2017-09-11 NOTE — TELEPHONE ENCOUNTER
Mandi from [address] [place] [address] called inquiring about pt's order for incontinences supplies faxed 9/6/2017. She states that the patient is going to run out. Please advise.

## 2017-09-13 ENCOUNTER — ANTI-COAG VISIT (OUTPATIENT)
Dept: ONCOLOGY | Age: 82
End: 2017-09-13

## 2017-09-13 VITALS — SYSTOLIC BLOOD PRESSURE: 133 MMHG | TEMPERATURE: 97.5 F | HEART RATE: 58 BPM | DIASTOLIC BLOOD PRESSURE: 61 MMHG

## 2017-09-13 DIAGNOSIS — I82.409 THROMBOEMBOLISM OF DEEP VEINS OF LOWER EXTREMITY, UNSPECIFIED LATERALITY (HCC): Primary | ICD-10-CM

## 2017-09-13 LAB
INR BLD: 2.9 (ref 2–3)
VALID INTERNAL CONTROL?: YES

## 2017-09-27 ENCOUNTER — ANTI-COAG VISIT (OUTPATIENT)
Dept: ONCOLOGY | Age: 82
End: 2017-09-27

## 2017-09-27 VITALS — TEMPERATURE: 97.5 F | HEART RATE: 60 BPM | DIASTOLIC BLOOD PRESSURE: 62 MMHG | SYSTOLIC BLOOD PRESSURE: 137 MMHG

## 2017-09-27 DIAGNOSIS — I82.409 THROMBOEMBOLISM OF DEEP VEINS OF LOWER EXTREMITY, UNSPECIFIED LATERALITY (HCC): Primary | ICD-10-CM

## 2017-09-27 LAB
INR BLD: 3 (ref 2–3)
VALID INTERNAL CONTROL?: YES

## 2017-10-04 ENCOUNTER — ANTI-COAG VISIT (OUTPATIENT)
Dept: ONCOLOGY | Age: 82
End: 2017-10-04

## 2017-10-04 VITALS — SYSTOLIC BLOOD PRESSURE: 134 MMHG | DIASTOLIC BLOOD PRESSURE: 71 MMHG | TEMPERATURE: 97.7 F | HEART RATE: 57 BPM

## 2017-10-04 DIAGNOSIS — I82.409 THROMBOEMBOLISM OF DEEP VEINS OF LOWER EXTREMITY, UNSPECIFIED LATERALITY (HCC): Primary | ICD-10-CM

## 2017-10-04 DIAGNOSIS — D68.59 THROMBOPHILIA (HCC): ICD-10-CM

## 2017-10-04 LAB
INR BLD: 2.7 (ref 2–3)
VALID INTERNAL CONTROL?: YES

## 2017-10-11 ENCOUNTER — ANTI-COAG VISIT (OUTPATIENT)
Dept: ONCOLOGY | Age: 82
End: 2017-10-11

## 2017-10-11 VITALS — TEMPERATURE: 97.3 F | HEART RATE: 62 BPM | SYSTOLIC BLOOD PRESSURE: 138 MMHG | DIASTOLIC BLOOD PRESSURE: 67 MMHG

## 2017-10-11 DIAGNOSIS — D68.59 THROMBOPHILIA (HCC): ICD-10-CM

## 2017-10-11 DIAGNOSIS — I82.409 THROMBOEMBOLISM OF DEEP VEINS OF LOWER EXTREMITY, UNSPECIFIED LATERALITY (HCC): Primary | ICD-10-CM

## 2017-10-11 LAB
INR BLD: 1.7 (ref 2–3)
VALID INTERNAL CONTROL?: YES

## 2017-10-13 RX ORDER — POTASSIUM CHLORIDE 1500 MG/1
TABLET, EXTENDED RELEASE ORAL
Qty: 60 TAB | Refills: 0 | Status: SHIPPED | OUTPATIENT
Start: 2017-10-13 | End: 2017-12-06 | Stop reason: SDUPTHER

## 2017-10-13 NOTE — TELEPHONE ENCOUNTER
This pharmacy faxed over request for the following prescriptions to be filled:    Medication requested :   Requested Prescriptions     Pending Prescriptions Disp Refills    KLOR-CON M20 20 mEq tablet [Pharmacy Med Name: Klor-Con M20 Oral Tablet Extended Release 20 MEQ] 60 Tab 0     Sig: TAKE ONE TABLET BY MOUTH TWICE DAILY     PCP: [address] [place] [address] or Print: [employer]  Mail order or Local pharmacy [facility]     Scheduled appointment if not seen by current providers in office: LOV 7/20/2017 f/u 12/21/2017

## 2017-10-18 ENCOUNTER — ANTI-COAG VISIT (OUTPATIENT)
Dept: ONCOLOGY | Age: 82
End: 2017-10-18

## 2017-10-18 VITALS — HEART RATE: 56 BPM | SYSTOLIC BLOOD PRESSURE: 138 MMHG | TEMPERATURE: 97.5 F | DIASTOLIC BLOOD PRESSURE: 67 MMHG

## 2017-10-18 DIAGNOSIS — I82.409 THROMBOEMBOLISM OF DEEP VEINS OF LOWER EXTREMITY, UNSPECIFIED LATERALITY (HCC): Primary | ICD-10-CM

## 2017-10-18 LAB
INR BLD: NORMAL (ref 2–3)
VALID INTERNAL CONTROL?: YES

## 2017-11-08 ENCOUNTER — ANTI-COAG VISIT (OUTPATIENT)
Dept: ONCOLOGY | Age: 82
End: 2017-11-08

## 2017-11-08 VITALS — HEART RATE: 57 BPM | SYSTOLIC BLOOD PRESSURE: 129 MMHG | DIASTOLIC BLOOD PRESSURE: 56 MMHG | TEMPERATURE: 96.6 F

## 2017-11-08 DIAGNOSIS — I82.409 THROMBOEMBOLISM OF DEEP VEINS OF LOWER EXTREMITY, UNSPECIFIED LATERALITY (HCC): Primary | ICD-10-CM

## 2017-11-08 LAB
INR BLD: 3 (ref 2–3)
VALID INTERNAL CONTROL?: YES

## 2017-11-22 ENCOUNTER — ANTI-COAG VISIT (OUTPATIENT)
Dept: ONCOLOGY | Age: 82
End: 2017-11-22

## 2017-11-22 ENCOUNTER — HOSPITAL ENCOUNTER (OUTPATIENT)
Dept: ONCOLOGY | Age: 82
Discharge: HOME | End: 2017-11-22

## 2017-11-22 ENCOUNTER — OFFICE VISIT (OUTPATIENT)
Dept: ONCOLOGY | Age: 82
End: 2017-11-22

## 2017-11-22 VITALS — HEART RATE: 59 BPM | SYSTOLIC BLOOD PRESSURE: 131 MMHG | TEMPERATURE: 97.5 F | DIASTOLIC BLOOD PRESSURE: 66 MMHG

## 2017-11-22 VITALS — TEMPERATURE: 97.5 F | SYSTOLIC BLOOD PRESSURE: 131 MMHG | DIASTOLIC BLOOD PRESSURE: 66 MMHG | HEART RATE: 59 BPM

## 2017-11-22 DIAGNOSIS — D68.59 THROMBOPHILIA (HCC): ICD-10-CM

## 2017-11-22 DIAGNOSIS — I82.402 DEEP VENOUS EMBOLISM AND THROMBOSIS OF LEFT LOWER EXTREMITY (HCC): ICD-10-CM

## 2017-11-22 DIAGNOSIS — E55.9 VITAMIN D DEFICIENCY: ICD-10-CM

## 2017-11-22 DIAGNOSIS — I82.402 DEEP VENOUS EMBOLISM AND THROMBOSIS OF LEFT LOWER EXTREMITY (HCC): Primary | ICD-10-CM

## 2017-11-22 DIAGNOSIS — M17.0 PRIMARY OSTEOARTHRITIS OF BOTH KNEES: ICD-10-CM

## 2017-11-22 DIAGNOSIS — D72.9 NEUTROPHILIA: ICD-10-CM

## 2017-11-22 LAB
BASO+EOS+MONOS # BLD AUTO: 0.6 K/UL (ref 0–2.3)
BASO+EOS+MONOS # BLD AUTO: 9 % (ref 0.1–17)
DIFFERENTIAL METHOD BLD: ABNORMAL
ERYTHROCYTE [DISTWIDTH] IN BLOOD BY AUTOMATED COUNT: 16.9 % (ref 11.5–14.5)
HCT VFR BLD AUTO: 31.9 % (ref 36–48)
HGB BLD-MCNC: 10.4 G/DL (ref 12–16)
INR BLD: 3.5 (ref 2–3)
LYMPHOCYTES # BLD: 1 K/UL (ref 1.1–5.9)
LYMPHOCYTES NFR BLD: 14 % (ref 14–44)
MCH RBC QN AUTO: 34.3 PG (ref 25–35)
MCHC RBC AUTO-ENTMCNC: 32.6 G/DL (ref 31–37)
MCV RBC AUTO: 105.3 FL (ref 78–102)
NEUTS SEG # BLD: 5.8 K/UL (ref 1.8–9.5)
NEUTS SEG NFR BLD: 77 % (ref 40–70)
PLATELET # BLD AUTO: 273 K/UL (ref 140–440)
RBC # BLD AUTO: 3.03 M/UL (ref 4.1–5.1)
VALID INTERNAL CONTROL?: YES
WBC # BLD AUTO: 7.4 K/UL (ref 4.5–13)

## 2017-11-22 NOTE — PROGRESS NOTES
Hematology/Oncology  Progress Note    Name: Bud Sol  Date: 2017  : 1935     Primary Care Provider: Erik Funez MD      Ms. Tali Apodaca is a [de-identified]y.o. year old female who was seen for management of her thrombophilia. Current therapy: Coumadin 5 mg daily. Subjective:     Ms. Orvel Galeazzi is a 53-year-old woman who has an underlying thrombophilia disorder. She is on chronic anticoagulation therapy. Currently she is taking 3 mg alternating with 4 mg every other day. Her INR value today is 2.5. She has not experienced any shortness of breath, cough, bruising, or bleeding. She remained essentially wheelchair confined. She was denied home INR device per insurance. She comes to come to  the clinic Q 2 weeks to have her INR checked. The patient has chronic arthritic discomfort in her lower extremities as well. The past medical, surgical and social history has been reviewed and remains unchanged. Past Medical History:   Diagnosis Date    Diverticulosis     Hypercholesterolemia     Hypertension     Polio     Thromboemb[address] ([facility] [address] [address]     Left leg, indefinite anticoag Dr. Birdie Quinn NR goal 1.5-2     No past surgical history on file.   Social History     Social History    Marital status:      Spouse name: N/A    Number of children: N/A    Years of education: N/A     Occupational History    n/a      Social History Main Topics    Smoking status: Never Smoker    Smokeless tobacco: Never Used    Alcohol use No    Drug use: Not on file    Sexual activity: Not Currently     Partners: Male     Other Topics Concern    Not on file     Social History Narrative     Family History   Problem Relation Age of Onset    Asthma Father      Current Outpatient Prescriptions   Medication Sig Dispense Refill    KLOR-CON M20 20 mEq tablet TAKE ONE TABLET BY MOUTH TWICE DAILY  60 Tab 0    atenolol (TENORMIN) 50 mg tablet TAKE 1 TABLET (50 MG) BY ORAL ROUTE ONCE DAILY 90 Tab 0    warfarin (COUMADIN) 1 mg tablet Take 1-5 tablets by mouth daily pending the INR values 150 Tab 11    traMADol (ULTRAM) 50 mg tablet TAKE ONE TABLET BY MOUTH EVERY 6 HOURS AS NEEDED FOR PAIN 60 Tab 0    pravastatin (PRAVACHOL) 40 mg tablet TAKE 1  TABLET BY MOUTH EVERY EVENING 90 Tab 3    valsartan-hydroCHLOROthiazide (DIOVAN-HCT) 160-12.5 mg per tablet TAKE 1 TABLET BY ORAL ROUTE ONCE DAILY 30 Tab 10    magnesium oxide (MAG-OX) 400 mg tablet Take 1 Tab by mouth daily. 90 Tab 3    ergocalciferol (ERGOCALCIFEROL) 50,000 unit capsule Take 1 Cap by mouth every seven (7) days. 12 Cap 3    warfarin (COUMADIN) 5 mg tablet TAKE 1 TABLET (5 MG) BY ORAL ROUTE ONCE DAILY 30 Tab 11    albuterol (PROVENTIL HFA, VENTOLIN HFA, PROAIR HFA) 90 mcg/actuation inhaler Take 2 Puffs by inhalation every four (4) hours as needed for Wheezing. 1 Inhaler 0    fluticasone (FLONASE) 50 mcg/actuation nasal spray 2 sprays each nostril once daily 1 Bottle 0    cetirizine (ZYRTEC) 10 mg tablet Take 1 Tab by mouth daily. 30 Tab 11    miscellaneous medical supply misc Please extend/provide mobile wheelchair  Dx: polio contracture 1 Each 0       Review of Systems  Constitutional: The patient has no acute distress or discomfort. HEENT: The patient denies recent head trauma, eye pain, blurred vision, hearing deficit, oropharyngeal mucosal pain or lesions, and the patient denies throat pain or discomfort. Lymphatics: The patient denies palpable peripheral lymphadenopathy. Hematologic: The patient denies having bruising, bleeding, or progressive fatigue. Respiratory: Patient denies having shortness of breath, cough, sputum production, fever, or dyspnea on exertion. Cardiovascular: The patient denies having leg pain, leg swelling, heart palpitations, chest pain, or lightheadedness. The patient denies having dyspnea on exertion. Gastrointestinal: The patient denies having nausea, emesis, or diarrhea.  The patient denies having any hematemesis or blood in the stool. Genitourinary: Patient denies having urinary urgency, frequency, or dysuria. The patient denies having blood in the urine. Psychological: The patient denies having symptoms of nervousness, anxiety, depression, or thoughts of harming himself some of this. Skin: Patient denies having skin rashes, skin, ulcerations, or unexplained itching or pruritus. Musculoskeletal: Occasional arthritic pain at multiple joints, not new. Wheelchair. Limited ROM at RLE 2' polio hx. Objective: There were no vitals taken for this visit. Pain Score:     Physical Exam:   Gen. Appearance: The patient is in no acute distress. Skin: There is no bruise or rash. HEENT: The exam is unremarkable. Neck: Supple without lymphadenopathy or thyromegaly. Lungs: Clear to auscultation and percussion; there are no wheezes or rhonchi. Heart: Regular rate and rhythm; there are no murmurs, gallops, or rubs. Anterior chest wall and breasts: Deferred. Abdomen: Bowel sounds are present and normal. There is no guarding, tenderness, or hepatosplenomegaly. Extremities: There is no clubbing, cyanosis, or edema. Neurologic: There are no focal neurologic deficits. Lymphatics: There is no palpable peripheral lymphadenopathy. Musculoskeletal: LROM and non-weight bearing at RLE. Otherwise FROM at all other ext's. Psychological/psychiatric: There is no clinical evidence of anxiety, depression, or melancholy.     Laboratory Data:     Results for orders placed or performed during the hospital encounter of 07/05/17   CBC WITH 3 PART DIFF     Status: Abnormal   Result Value Ref Range Status    WBC 8.6 4.5 - 13.0 K/uL Final    RBC 4.06 (L) 4.10 - 5.10 M/uL Final    HGB 12.0 12.0 - 16 g/dL Final    HCT 37.6 36 - 48 % Final    MCV 92.6 78 - 102 FL Final    MCH 29.6 25.0 - 35.0 PG Final    MCHC 31.9 31 - 37 g/dL Final    RDW 13.6 11.5 - 14.5 % Final    PLATELET 150 402 - 669 K/uL Final    NEUTROPHILS 76 (H) 40 - 70 % Final    MIXED CELLS 8 0.1 - 17 % Final    LYMPHOCYTES 17 14 - 44 % Final    ABS. NEUTROPHILS 6.5 1.8 - 9.5 K/UL Final    ABS. MIXED CELLS 0.7 0.0 - 2.3 K/uL Final    ABS. LYMPHOCYTES 1.4 1.1 - 5.9 K/UL Final     Comment: Test performed at [place] [address] Location. Results Reviewed by Medical Director. 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 Deep venous embolism and thrombosis of left lower extremity ([facility]    2. Thrombophilia ([facility]    3. Neutrophilia    4. Vitamin D deficiency    5. Primary osteoarthritis of both knees          Plan: Thrombophilia/DVT left lower extremity: I have explained to the patient that the current value of her INR is 3.5. I have recommended that she hold her Coumadin tonight and restart tomorrow at  3 mg. We will recheck her INR value in 2 weeks. The current CBC shows a stable WBC count of 7.4, her hemoglobin has declined to 10.4 g/dL with hematocrit of 31.9. The platelet count is 530,134. I will have her return to clinic for a complete reassessment again in 12 weeks. She will return to our clinic for INR check in 2 weeks. Vitamin D deficiency: The patient has previously been prescribed vitamin D supplementation. however she has not been compliant in taking the supplement. I have encourage her to start taking her Vitamin D supplement.  I have explained to the patient that she can continue to take over-the-counter vitamin D with calcium supplementation once she is done with this dose. At her next clinic visit in 12 weeks we will recheck her vitamin D levels. Primary osteoarthritis of both knees: The patient is currently taking tramadol 50 mg every 8 hours on a when necessary basis. I have advised the patient to also use Tylenol arthritis 2 tablets every 8 hours as needed. Since she is on long-term anticoagulation the patient was advised not to use over-the-counter NSAIDs such as aspirin, Motrin, or Aleve. These medications can increase the risk of bleeding again also impair renal function. Thrombophilia (persisting problem) I have explained to the patient and her neutrophil count is elevated to 77%. However the absolute neutrophil count remains normal at 5.8 with an absolute lymphocyte count of 1. We will monitor this at 12 week intervals. If there is persistent elevation in 12 weeks we will order flow cytometry for additional assessments. The patient will return to clinic for a follow-up visit in 4 months. Follow-up Disposition: Not on File   Orders Placed This Encounter    VITAMIN D, 25 HYDROXY     Standing Status:   Future     Standing Expiration Date:   37/64/6897    METABOLIC PANEL, COMPREHENSIVE     Standing Status:   Future     Standing Expiration Date:   11/23/2018       Lashawn Rodrigues MD  7/5/2017

## 2017-11-22 NOTE — PATIENT INSTRUCTIONS
Deep Vein Thrombosis: Care Instructions  Your Care Instructions    A deep vein thrombosis (DVT) is a blood clot in certain veins of the legs, pelvis, or arms. Blood clots in these veins need to be treated because they can get bigger, break loose, and travel through the bloodstream to the lungs. A blood clot in a lung can be life-threatening. The doctor may have given you a blood thinner (anticoagulant). A blood thinner can stop the blood clot from growing larger and prevent new clots from forming. You will need to take a blood thinner for 3 to 6 months or longer. The doctor has checked you carefully, but problems can develop later. If you notice any problems or new symptoms, get medical treatment right away. Follow-up care is a key part of your treatment and safety. Be sure to make and go to all appointments, and call your doctor if you are having problems. It's also a good idea to know your test results and keep a list of the medicines you take. How can you care for yourself at home? · Take your medicines exactly as prescribed. Call your doctor if you think you are having a problem with your medicine. · If you are taking a blood thinner, be sure you get instructions about how to take your medicine safely. Blood thinners can cause serious bleeding problems. · Wear compression stockings if your doctor recommends them. These stockings are tighter at the feet than on the legs. They may reduce pain and swelling in your legs. But there are different types of stockings, and they need to fit right. So your doctor will recommend what you need. · When you sit, use a pillow to raise the arm or leg that has the blood clot. Try to keep it above the level of your heart. When should you call for help? Call 911 anytime you think you may need emergency care. For example, call if:  ? · You passed out (lost consciousness). ? · You have symptoms of a blood clot in your lung (called a pulmonary embolism).  These include:  ¨ Sudden chest pain. ¨ Trouble breathing. ¨ Coughing up blood. ?Call your doctor now or seek immediate medical care if:  ? · You have new or worse trouble breathing. ? · You are dizzy or lightheaded, or you feel like you may faint. ? · You have symptoms of a blood clot in your arm or leg. These may include:  ¨ Pain in the arm, calf, back of the knee, thigh, or groin. ¨ Redness and swelling in the arm, leg, or groin. ? Watch closely for changes in your health, and be sure to contact your doctor if:  ? · You do not get better as expected. Where can you learn more? Go to http://lawrence-yennifer.info/. Enter I319 in the search box to learn more about \"Deep Vein Thrombosis: Care Instructions. \"  Current as of: March 20, 2017  Content Version: 11.4  © 9255-8829 [employer]. Care instructions adapted under license by [employer] (which disclaims liability or warranty for this information). If you have questions about a medical condition or this instruction, always ask your healthcare professional. [place] [address] any warranty or liability for your use of this information.

## 2017-11-22 NOTE — MR AVS SNAPSHOT
Visit Information Date & Time Provider Department Dept. Phone Encounter #  
 11/22/2017  9:00 AM Osbaldo Hernandez MD [facility] Medical Oncology [phone] [id number] Follow-up Instructions Return in about 4 months (around 3/22/2018). Your Appointments 12/6/2017 10:30 AM  
ANTICOAG NURSE with [employer] [facility] Oncology ([facility]) Appt Note: INR  
 [address] [place], [place] [address] [place] [address]  
  
   
 [address] [facility] [address] [facility] [address]  
  
    
 12/21/2017 11:00 AM  
ROUTINE CARE with Saud Maria MD  
[facility] ([facility]) Appt Note: routine f/u 5mo [address] [place] [address] [address] [facility] [address] [facility] [address]  
  
    
 2/14/2018 10:00 AM  
Office Visit with Osbaldo Hernandez MD  
[address] [place] [address] Oncology [facility]) Appt Note: 12 weeks [address] Upcoming Health Maintenance Date Due  
 GLAUCOMA SCREENING Q2Y 4/9/2016 Influenza Age 5 to Adult 8/1/2017 MEDICARE YEARLY EXAM 7/21/2018 DTaP/Tdap/Td series (2 - Td) 7/5/2026 Allergies as of 11/22/2017  Review Complete On: 7/20/2017 By: Saud Maria MD  
  
 Severity Noted Reaction Type Reactions Lipitor [Atorvastatin]  07/22/2013   Side Effect Myalgia Current Immunizations  Reviewed on 9/22/2014 Name Date Influenza High Dose Vaccine PF 1/17/2017 Influenza Vaccine PF 9/22/2014 Pneumococcal Conjugate (PCV-13) 7/5/2016 Pneumococcal Polysaccharide (PPSV-23) 9/19/2013 Not reviewed this visit You Were Diagnosed With   
  
 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 
ICD-9-CM: 289.81 Neutrophilia     ICD-10-CM: D72.9 ICD-9-CM: 601. 8 Vitamin D deficiency     ICD-10-CM: E55.9 ICD-9-CM: 268.9 Primary osteoarthritis of both knees     ICD-10-CM: M17.0 ICD-9-CM: 715.16 Vitals BP Pulse Temp OB Status Smoking Status 131/66 (BP 1 Location: Left arm, BP Patient Position: Sitting) (!) 59 97.5 °F (36.4 °C) (Oral) Postmenopausal Never Smoker Preferred Pharmacy Pharmacy Name [facility] [place] [phone] Your Updated Medication List  
  
   
This list is accurate as of: 11/22/17  9:39 AM.  Always use your most recent med list.  
  
  
  
  
 albuterol 90 mcg/actuation inhaler Commonly known as:  PROVENTIL HFA, VENTOLIN HFA, PROAIR HFA Take 2 Puffs by inhalation every four (4) hours as needed for Wheezing. atenolol 50 mg tablet Commonly known as:  TENORMIN  
TAKE 1 TABLET (50 MG) BY ORAL ROUTE ONCE DAILY  
  
 cetirizine 10 mg tablet Commonly known as:  ZYRTEC Take 1 Tab by mouth daily. ergocalciferol 50,000 unit capsule Commonly known as:  ERGOCALCIFEROL Take 1 Cap by mouth every seven (7) days. fluticasone 50 mcg/actuation nasal spray Commonly known as:  FLONASE  
2 sprays each nostril once daily KLOR-CON M20 20 mEq tablet Generic drug:  potassium chloride TAKE ONE TABLET BY MOUTH TWICE DAILY  
  
 magnesium oxide 400 mg tablet Commonly known as:  MAG-OX Take 1 Tab by mouth daily. miscellaneous medical supply Misc Please extend/provide mobile wheelchair  Dx: polio contracture  
  
 pravastatin 40 mg tablet Commonly known as:  PRAVACHOL  
TAKE 1  TABLET BY MOUTH EVERY EVENING  
  
 traMADol 50 mg tablet Commonly known as:  Jeralene Melena  
 TAKE ONE TABLET BY MOUTH EVERY 6 HOURS AS NEEDED FOR PAIN  
  
 valsartan-hydroCHLOROthiazide 160-12.5 mg per tablet Commonly known as:  DIOVAN-HCT  
TAKE 1 TABLET BY ORAL ROUTE ONCE DAILY * warfarin 5 mg tablet Commonly known as:  COUMADIN  
TAKE 1 TABLET (5 MG) BY ORAL ROUTE ONCE DAILY * warfarin 1 mg tablet Commonly known as:  COUMADIN Take 1-5 tablets by mouth daily pending the INR values * Notice: This list has 2 medication(s) that are the same as other medications prescribed for you. Read the directions carefully, and ask your doctor or other care provider to review them with you. We Performed the Following COMPLETE CBC & AUTO DIFF WBC [54209 CPT(R)] METABOLIC PANEL, COMPREHENSIVE [09736 CPT(R)] VITAMIN D, 25 HYDROXY [id number] CPT(R)] Follow-up Instructions Return in about 4 months (around 3/22/2018). To-Do List   
 11/22/2017 Lab:  CBC WITH 3 PART DIFF Patient Instructions Deep Vein Thrombosis: Care Instructions Your Care Instructions A deep vein thrombosis (DVT) is a blood clot in certain veins of the legs, pelvis, or arms. Blood clots in these veins need to be treated because they can get bigger, break loose, and travel through the bloodstream to the lungs. A blood clot in a lung can be life-threatening. The doctor may have given you a blood thinner (anticoagulant). A blood thinner can stop the blood clot from growing larger and prevent new clots from forming. You will need to take a blood thinner for 3 to 6 months or longer. The doctor has checked you carefully, but problems can develop later. If you notice any problems or new symptoms, get medical treatment right away. Follow-up care is a key part of your treatment and safety. Be sure to make and go to all appointments, and call your doctor if you are having problems. It's also a good idea to know your test results and keep a list of the medicines you take. How can you care for yourself at home? · Take your medicines exactly as prescribed. Call your doctor if you think you are having a problem with your medicine. · If you are taking a blood thinner, be sure you get instructions about how to take your medicine safely. Blood thinners can cause serious bleeding problems. · Wear compression stockings if your doctor recommends them. These stockings are tighter at the feet than on the legs. They may reduce pain and swelling in your legs. But there are different types of stockings, and they need to fit right. So your doctor will recommend what you need. · When you sit, use a pillow to raise the arm or leg that has the blood clot. Try to keep it above the level of your heart. When should you call for help? Call 911 anytime you think you may need emergency care. For example, call if: 
? · You passed out (lost consciousness). ? · You have symptoms of a blood clot in your lung (called a pulmonary embolism). These include: 
¨ Sudden chest pain. ¨ Trouble breathing. ¨ Coughing up blood. ?Call your doctor now or seek immediate medical care if: 
? · You have new or worse trouble breathing. ? · You are dizzy or lightheaded, or you feel like you may faint. ? · You have symptoms of a blood clot in your arm or leg. These may include: 
¨ Pain in the arm, calf, back of the knee, thigh, or groin. ¨ Redness and swelling in the arm, leg, or groin. ? Watch closely for changes in your health, and be sure to contact your doctor if: 
? · You do not get better as expected. Where can you learn more? Go to http://lawrence-yennifer.info/. Enter K668 in the search box to learn more about \"Deep Vein Thrombosis: Care Instructions. \" Current as of: March 20, 2017 Content Version: 11.4 © 8893-3251 [employer].  Care instructions adapted under license by [employer] (which disclaims liability or warranty for this information). If you have questions about a medical condition or this instruction, always ask your healthcare professional. [place] [address] any warranty or liability for your use of this information. Introducing [facility]! [facility] introduces [employer] patient portal. Now you can access parts of your medical record, email your doctor's office, and request medication refills online. 1. In your internet browser, go to [email or web address] 2. Click on the First Time User? Click Here link in the Sign In box. You will see the New Member Sign Up page. 3. Enter your [employer] Access Code exactly as it appears below. You will not need to use this code after youve completed the sign-up process. If you do not sign up before the expiration date, you must request a new code. · [employer] Access Code: [facility] Expires: 2/20/2018  9:02 AM 
 
4. Enter the last four digits of your Social Security Number (xxxx) and Date of Birth (mm/dd/yyyy) as indicated and click Submit. You will be taken to the next sign-up page. 5. Create a [employer] ID. This will be your [employer] login ID and cannot be changed, so think of one that is secure and easy to remember. 6. Create a [employer] password. You can change your password at any time. 7. Enter your Password Reset Question and Answer. This can be used at a later time if you forget your password. 8. Enter your e-mail address. You will receive e-mail notification when new information is available in [address] 9. Click Sign Up. You can now view and download portions of your medical record. 10. Click the Download Summary menu link to download a portable copy of your medical information. If you have questions, please visit the Frequently Asked Questions section of the [employer] website. Remember, [employer] is NOT to be used for urgent needs. For medical emergencies, dial 911. Now available from your iPhone and Android! Please provide this summary of care documentation to your next provider. Your primary care clinician is listed as Funmilayo Cardenas. If you have any questions after today's visit, please call [phone].

## 2017-11-24 LAB
25(OH)D3+25(OH)D2 SERPL-MCNC: 14.6 NG/ML (ref 30–100)
ALBUMIN SERPL-MCNC: 3.3 G/DL (ref 3.5–4.7)
ALBUMIN/GLOB SERPL: 1.1 {RATIO} (ref 1.2–2.2)
ALP SERPL-CCNC: 124 IU/L (ref 39–117)
ALT SERPL-CCNC: 9 IU/L (ref 0–32)
AST SERPL-CCNC: 19 IU/L (ref 0–40)
BILIRUB SERPL-MCNC: 0.5 MG/DL (ref 0–1.2)
BUN SERPL-MCNC: 12 MG/DL (ref 8–27)
BUN/CREAT SERPL: 30 (ref 12–28)
CALCIUM SERPL-MCNC: 8.1 MG/DL (ref 8.7–10.3)
CHLORIDE SERPL-SCNC: 95 MMOL/L (ref 96–106)
CO2 SERPL-SCNC: 31 MMOL/L (ref 18–29)
CREAT SERPL-MCNC: 0.4 MG/DL (ref 0.57–1)
GFR SERPLBLD CREATININE-BSD FMLA CKD-EPI: 114 ML/MIN/1.73
GFR SERPLBLD CREATININE-BSD FMLA CKD-EPI: 99 ML/MIN/1.73
GLOBULIN SER CALC-MCNC: 3 G/DL (ref 1.5–4.5)
GLUCOSE SERPL-MCNC: 85 MG/DL (ref 65–99)
POTASSIUM SERPL-SCNC: 5 MMOL/L (ref 3.5–5.2)
PROT SERPL-MCNC: 6.3 G/DL (ref 6–8.5)
SODIUM SERPL-SCNC: 137 MMOL/L (ref 134–144)

## 2017-11-29 DIAGNOSIS — E55.9 VITAMIN D DEFICIENCY: Primary | ICD-10-CM

## 2017-11-29 RX ORDER — ERGOCALCIFEROL 1.25 MG/1
50000 CAPSULE ORAL
Qty: 12 CAP | Refills: 3 | Status: SHIPPED | OUTPATIENT
Start: 2017-11-29 | End: 2018-08-02 | Stop reason: SDUPTHER

## 2017-12-06 ENCOUNTER — TELEPHONE (OUTPATIENT)
Dept: FAMILY MEDICINE CLINIC | Age: 82
End: 2017-12-06

## 2017-12-06 ENCOUNTER — ANTI-COAG VISIT (OUTPATIENT)
Dept: ONCOLOGY | Age: 82
End: 2017-12-06

## 2017-12-06 VITALS — DIASTOLIC BLOOD PRESSURE: 56 MMHG | TEMPERATURE: 97 F | SYSTOLIC BLOOD PRESSURE: 122 MMHG | HEART RATE: 58 BPM

## 2017-12-06 DIAGNOSIS — I82.402 DEEP VENOUS EMBOLISM AND THROMBOSIS OF LEFT LOWER EXTREMITY (HCC): Primary | ICD-10-CM

## 2017-12-06 LAB
INR BLD: 2 (ref 2–3)
VALID INTERNAL CONTROL?: YES

## 2017-12-06 NOTE — TELEPHONE ENCOUNTER
Patient is requesting (New  Updated) referral to the following office:    Speciality:   Specialist Name: Dr. Malik Lezama Location: [address] [place] [address] Dr. Miquel Stringer, [address]  Phone (if available): [phone]  Fax (if available): [phone]  Diagnosis: H25.13  Date of appointment (if scheduled): 12/7/17

## 2017-12-07 RX ORDER — POTASSIUM CHLORIDE 1500 MG/1
TABLET, EXTENDED RELEASE ORAL
Qty: 60 TAB | Refills: 0 | Status: SHIPPED | OUTPATIENT
Start: 2017-12-07

## 2017-12-07 RX ORDER — ATENOLOL 50 MG/1
TABLET ORAL
Qty: 90 TAB | Refills: 0 | Status: SHIPPED | OUTPATIENT
Start: 2017-12-07

## 2017-12-14 ENCOUNTER — OFFICE VISIT (OUTPATIENT)
Dept: FAMILY MEDICINE CLINIC | Age: 82
End: 2017-12-14

## 2017-12-14 VITALS
HEART RATE: 61 BPM | OXYGEN SATURATION: 90 % | RESPIRATION RATE: 16 BRPM | DIASTOLIC BLOOD PRESSURE: 78 MMHG | SYSTOLIC BLOOD PRESSURE: 122 MMHG | TEMPERATURE: 97.6 F | HEIGHT: 59 IN

## 2017-12-14 DIAGNOSIS — Z01.818 PRE-OP EVALUATION: ICD-10-CM

## 2017-12-14 DIAGNOSIS — Z23 ENCOUNTER FOR IMMUNIZATION: ICD-10-CM

## 2017-12-14 DIAGNOSIS — E78.00 PURE HYPERCHOLESTEROLEMIA: ICD-10-CM

## 2017-12-14 DIAGNOSIS — H26.9 CATARACT, UNSPECIFIED CATARACT TYPE, UNSPECIFIED LATERALITY: ICD-10-CM

## 2017-12-14 DIAGNOSIS — E55.9 VITAMIN D DEFICIENCY: ICD-10-CM

## 2017-12-14 DIAGNOSIS — A80.9 POLIO: ICD-10-CM

## 2017-12-14 DIAGNOSIS — E66.01 MORBID OBESITY (HCC): ICD-10-CM

## 2017-12-14 DIAGNOSIS — I82.402 DEEP VENOUS EMBOLISM AND THROMBOSIS OF LEFT LOWER EXTREMITY (HCC): ICD-10-CM

## 2017-12-14 DIAGNOSIS — I10 ESSENTIAL HYPERTENSION: Primary | ICD-10-CM

## 2017-12-14 NOTE — PROGRESS NOTES
Nestor Orana maría, [de-identified] y.o.,  female    SUBJECTIVE  Routine ff-up and pre op exam for cataract    HTN- taking medications without problems, denies cp, sob, leg edema. Inconsistent with diet, she drinks mountain dew regularly, advised to cut down. HL- on pravachol    Continues to follow heme dr. Cassia Louie for chronic anticoag for h/o dvt    Allergic rhinitis- asymptomatic this summer     bilateral knee pain/OA-takes tramadol about every day. ROS:  See HPI, all others negative        Patient Active Problem List   Diagnosis Code    Polio A80.9    Hypertension I10    Morbid obesity ([facility] [address] E66.01    Hyperlipidemia with target LDL less than 130 E78.5    Thrombophilia (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    Ventral hernia K43.9    Allergic rhinitis due to allergen J30.9    Primary osteoarthritis of both knees M17.0    Advance directive discussed with patient Z70.80    Vitamin D deficiency E55.9    Hypomagnesemia E83.42    Neutrophilia D72.9    Advance care planning Z71.89       Current Outpatient Prescriptions   Medication Sig Dispense Refill    atenolol (TENORMIN) 50 mg tablet TAKE ONE TABLET BY MOUTH ONE TIME DAILY  90 Tab 0    KLOR-CON M20 20 mEq tablet TAKE ONE TABLET BY MOUTH TWICE DAILY  60 Tab 0    ergocalciferol (ERGOCALCIFEROL) 50,000 unit capsule Take 1 Cap by mouth every seven (7) days. Indications: Vitamin D Deficiency 12 Cap 3    warfarin (COUMADIN) 1 mg tablet Take 1-5 tablets by mouth daily pending the INR values 150 Tab 11    traMADol (ULTRAM) 50 mg tablet TAKE ONE TABLET BY MOUTH EVERY 6 HOURS AS NEEDED FOR PAIN 60 Tab 0    pravastatin (PRAVACHOL) 40 mg tablet TAKE 1  TABLET BY MOUTH EVERY EVENING 90 Tab 3    valsartan-hydroCHLOROthiazide (DIOVAN-HCT) 160-12.5 mg per tablet TAKE 1 TABLET BY ORAL ROUTE ONCE DAILY 30 Tab 10    magnesium oxide (MAG-OX) 400 mg tablet Take 1 Tab by mouth daily.  90 Tab 3    warfarin (COUMADIN) 5 mg tablet TAKE 1 TABLET (5 MG) BY ORAL ROUTE ONCE DAILY 30 Tab 11    albuterol (PROVENTIL HFA, VENTOLIN HFA, PROAIR HFA) 90 mcg/actuation inhaler Take 2 Puffs by inhalation every four (4) hours as needed for Wheezing. 1 Inhaler 0    fluticasone (FLONASE) 50 mcg/actuation nasal spray 2 sprays each nostril once daily 1 Bottle 0    cetirizine (ZYRTEC) 10 mg tablet Take 1 Tab by mouth daily. 30 Tab 11    miscellaneous medical supply misc Please extend/provide mobile wheelchair  Dx: polio contracture 1 Each 0       Allergies   Allergen Reactions    Lipitor [Atorvastatin] Myalgia       Past Medical History:   Diagnosis Date    Diverticulosis     Hypercholesterolemia     Hypertension     Polio     Thromboembolus ([address] 2012    Left leg, indefinite anticoag Dr. Jenaro Rodriguez NR goal 1.5-2       Social History     Social History    Marital status:      Spouse name: N/A    Number of children: N/A    Years of education: N/A     Occupational History    n/a      Social History Main Topics    Smoking status: Never Smoker    Smokeless tobacco: Never Used    Alcohol use No    Drug use: Not on file    Sexual activity: Not Currently     Partners: Male     Other Topics Concern    Not on file     Social History Narrative       Family History   Problem Relation Age of Onset    Asthma Father          OBJECTIVE    Physical Exam:     Visit Vitals    /78 (BP 1 Location: Left arm, BP Patient Position: Sitting)    Pulse 61    Temp 97.6 °F (36.4 °C) (Oral)    Resp 16    Ht 4' 11\" (1.499 m)    SpO2 90%       General: alert, wheelchair , in no apparent distress or pain  CVS: normal rate, regular rhythm  Lungs:clear to ausculation bilaterally, no crackles, wheezing or rhonchi noted  Extremities:  R leg atrophy  Skin: warm, no lesions, rashes noted  Psych:  mood and affect normal      ASSESSMENT/PLAN  Orville Nur was seen today for hypertension and cholesterol problem.     Diagnoses and all orders for this visit:    Cataract    Pre op exam  Cleared for low risk procedure    Essential hypertension-  controlled,   cont diovan+ KCL, BB  Check CMP/Lipid/Vit D level in 3 months prior to next visit    Polio  On chronic anticoag w/ h/o DVT    Hyperlipidemia   Good range on pravachol     Primary osteoarthritis of both knees-persistent  Cont prn tramadol, c/w . Wt loss reiterated, avoid sodas    Vitamin D deficiency  More consistent with replacement, to cont  Recheck vit d level prior to next visit      Ff-up in 3 months or sooner prn    Patient understands plan of care. Patient has provided input and agrees with goals.

## 2017-12-14 NOTE — MR AVS SNAPSHOT
Visit Information Date & Time Provider Department Dept. Phone Encounter #  
 12/14/2017 10:45 AM Ace Sheikh, [address] [facility] [address] [id number] Follow-up Instructions Return in about 3 months (around 3/14/2018), or if symptoms worsen or fail to improve. Your Appointments 12/20/2017  9:20 AM  
ANTICOAG NURSE with BOONE MERCHANT [facility] ([address] [facility]) Appt Note: 2 wk inr  
 [address] [facility], [place] 281 [facility] 250 [facility]  
  
   
 [address] [facility] [address] [facility] [address]  
  
    
 12/21/2017 11:00 AM  
ROUTINE CARE with Ace Sheikh MD  
[address] [facility] ([address]) Appt Note: routine f/u 5mo [address] [facility] [address] 500 [facility] 250 [address] [facility] [address]  
  
    
 2/14/2018 10:00 AM  
Office Visit with Prabhu Corea MD  
[address]  Oncology [address] [facility]) Appt Note: 12 weeks [address] 733 [facility] 3200 [facility], [address] [place] [address] Se Upcoming Health Maintenance Date Due  
 MEDICARE YEARLY EXAM 7/21/2018 GLAUCOMA SCREENING Q2Y 11/20/2019 DTaP/Tdap/Td series (2 - Td) 7/5/2026 Allergies as of 12/14/2017  Review Complete On: 12/14/2017 By: Ace Sheikh MD  
  
 Severity Noted Reaction Type Reactions Lipitor [Atorvastatin]  07/22/2013   Side Effect Myalgia Current Immunizations  Reviewed on 9/22/2014 Name Date Influenza High Dose Vaccine PF 12/14/2017, 1/17/2017 Influenza Vaccine PF 9/22/2014 Pneumococcal Conjugate (PCV-13) 7/5/2016 Pneumococcal Polysaccharide (PPSV-23) 9/19/2013 Not reviewed this visit You Were Diagnosed With   
  
 Codes Comments Morbid obesity ([facility] [address]    -  Primary ICD-10-CM: E66.01 
ICD-9-CM: 278.01 Encounter for immunization     ICD-10-CM: F29 ICD-9-CM: V03.89 Cataract, unspecified cataract type, unspecified laterality     ICD-10-CM: H26.9 ICD-9-CM: 366.9 Vitamin D deficiency     ICD-10-CM: E55.9 ICD-9-CM: 268.9 Essential hypertension     ICD-10-CM: I10 
ICD-9-CM: 401.9 Polio     ICD-10-CM: A80.9 ICD-9-CM: 045.90 Vitals BP Pulse Temp Resp Height(growth percentile) SpO2  
 122/78 (BP 1 Location: Left arm, BP Patient Position: Sitting) 61 97.6 °F (36.4 °C) (Oral) 16 4' 11\" (1.499 m) 90% OB Status Smoking Status Postmenopausal Never Smoker Preferred Pharmacy Pharmacy Name Phone [id number] - [address] [phone] Your Updated Medication List  
  
   
This list is accurate as of: 12/14/17 11:16 AM.  Always use your most recent med list.  
  
  
  
  
 albuterol 90 mcg/actuation inhaler Commonly known as:  PROVENTIL HFA, VENTOLIN HFA, PROAIR HFA Take 2 Puffs by inhalation every four (4) hours as needed for Wheezing. atenolol 50 mg tablet Commonly known as:  TENORMIN  
TAKE ONE TABLET BY MOUTH ONE TIME DAILY  
  
 cetirizine 10 mg tablet Commonly known as:  ZYRTEC Take 1 Tab by mouth daily. ergocalciferol 50,000 unit capsule Commonly known as:  ERGOCALCIFEROL Take 1 Cap by mouth every seven (7) days. Indications: Vitamin D Deficiency  
  
 fluticasone 50 mcg/actuation nasal spray Commonly known as:  FLONASE  
2 sprays each nostril once daily KLOR-CON M20 20 mEq tablet Generic drug:  potassium chloride TAKE ONE TABLET BY MOUTH TWICE DAILY  
  
 magnesium oxide 400 mg tablet Commonly known as:  MAG-OX Take 1 Tab by mouth daily. miscellaneous medical supply Misc Please extend/provide mobile wheelchair  Dx: polio contracture  
  
 pravastatin 40 mg tablet Commonly known as:  PRAVACHOL  
TAKE 1  TABLET BY MOUTH EVERY EVENING  
  
 traMADol 50 mg tablet Commonly known as:  ULTRAM  
TAKE ONE TABLET BY MOUTH EVERY 6 HOURS AS NEEDED FOR PAIN  
  
 valsartan-hydroCHLOROthiazide 160-12.5 mg per tablet Commonly known as:  DIOVAN-HCT  
TAKE 1 TABLET BY ORAL ROUTE ONCE DAILY * warfarin 5 mg tablet Commonly known as:  COUMADIN  
TAKE 1 TABLET (5 MG) BY ORAL ROUTE ONCE DAILY * warfarin 1 mg tablet Commonly known as:  COUMADIN Take 1-5 tablets by mouth daily pending the INR values * Notice: This list has 2 medication(s) that are the same as other medications prescribed for you. Read the directions carefully, and ask your doctor or other care provider to review them with you. We Performed the Following ADMIN INFLUENZA VIRUS VAC [ [facility]] INFLUENZA VIRUS VACCINE, HIGH DOSE SEASONAL, PRESERVATIVE FREE [24717 CPT(R)] Follow-up Instructions Return in about 3 months (around 3/14/2018), or if symptoms worsen or fail to improve. To-Do List   
 12/14/2017 Lab:  LIPID PANEL   
  
 12/14/2017 Lab:  METABOLIC PANEL, COMPREHENSIVE   
  
 12/14/2017 Lab:  VITAMIN D, 25 HYDROXY Patient Instructions Influenza (Flu) Vaccine (Inactivated or Recombinant): What You Need to Know Why get vaccinated? Influenza (\"flu\") is a contagious disease that spreads around the [place] every winter, usually between October and May. Flu is caused by influenza viruses and is spread mainly by coughing, sneezing, and close contact. Anyone can get flu. Flu strikes suddenly and can last several days. Symptoms vary by age, but can include: · Fever/chills. · Sore throat. · Muscle aches. · Fatigue. · Cough. · Headache. · Runny or stuffy nose. Flu can also lead to pneumonia and blood infections, and cause diarrhea and seizures in children.  If you have a medical condition, such as heart or lung disease, flu can make it worse. Flu is more dangerous for some people. Infants and young children, people 72years of age and older, pregnant women, and people with certain health conditions or a weakened immune system are at greatest risk. Each year thousands of people in the [facility] die from flu, and many more are hospitalized. Flu vaccine can: · Keep you from getting flu. · Make flu less severe if you do get it. · Keep you from spreading flu to your family and other people. Inactivated and recombinant flu vaccines A dose of flu vaccine is recommended every flu season. Children 6 months through 6years of age may need two doses during the same flu season. Everyone else needs only one dose each flu season. Some inactivated flu vaccines contain a very small amount of a mercury-based preservative called thimerosal. Studies have not shown thimerosal in vaccines to be harmful, but flu vaccines that do not contain thimerosal are available. There is no live flu virus in flu shots. They cannot cause the flu. There are many flu viruses, and they are always changing. Each year a new flu vaccine is made to protect against three or four viruses that are likely to cause disease in the upcoming flu season. But even when the vaccine doesn't exactly match these viruses, it may still provide some protection. Flu vaccine cannot prevent: · Flu that is caused by a virus not covered by the vaccine. · Illnesses that look like flu but are not. Some people should not get this vaccine Tell the person who is giving you the vaccine: · If you have any severe (life-threatening) allergies. If you ever had a life-threatening allergic reaction after a dose of flu vaccine, or have a severe allergy to any part of this vaccine, you may be advised not to get vaccinated. Most, but not all, types of flu vaccine contain a small amount of egg protein.  
· If you ever had Guillain-Barré syndrome (also called GBS) Some people with a history of GBS should not get this vaccine. This should be discussed with your doctor. · If you are not feeling well. It is usually okay to get flu vaccine when you have a mild illness, but you might be asked to come back when you feel better. Risks of a vaccine reaction With any medicine, including vaccines, there is a chance of reactions. These are usually mild and go away on their own, but serious reactions are also possible. Most people who get a flu shot do not have any problems with it. Minor problems following a flu shot include: · Soreness, redness, or swelling where the shot was given · Hoarseness · Sore, red or itchy eyes · Cough · Fever · Aches · Headache · Itching · Fatigue If these problems occur, they usually begin soon after the shot and last 1 or 2 days. More serious problems following a flu shot can include the following: · There may be a small increased risk of Guillain-Barré Syndrome (GBS) after inactivated flu vaccine. This risk has been estimated at 1 or 2 additional cases per million people vaccinated. This is much lower than the risk of severe complications from flu, which can be prevented by flu vaccine. · April Aden children who get the flu shot along with pneumococcal vaccine (PCV13) and/or DTaP vaccine at the same time might be slightly more likely to have a seizure caused by fever. Ask your doctor for more information. Tell your doctor if a child who is getting flu vaccine has ever had a seizure Problems that could happen after any injected vaccine: · People sometimes faint after a medical procedure, including vaccination. Sitting or lying down for about 15 minutes can help prevent fainting, and injuries caused by a fall. Tell your doctor if you feel dizzy, or have vision changes or ringing in the ears. · Some people get severe pain in the shoulder and have difficulty moving the arm where a shot was given. This happens very rarely. · Any medication can cause a severe allergic reaction. Such reactions from a vaccine are very rare, estimated at about 1 in a million doses, and would happen within a few minutes to a few hours after the vaccination. As with any medicine, there is a very remote chance of a vaccine causing a serious injury or death. The safety of vaccines is always being monitored. For more information, visit: www.cdc.gov/vaccinesafety/. What if there is a serious reaction? What should I look for? · Look for anything that concerns you, such as signs of a severe allergic reaction, very high fever, or unusual behavior. Signs of a severe allergic reaction can include hives, swelling of the face and throat, difficulty breathing, a fast heartbeat, dizziness, and weakness - usually within a few minutes to a few hours after the vaccination. What should I do? · If you think it is a severe allergic reaction or other emergency that can't wait, call 9-1-1 and get the person to the nearest hospital. Otherwise, call your doctor. · Reactions should be reported to the \"Vaccine Adverse Event Reporting System\" (VAERS). Your doctor should file this report, or you can do it yourself through the VAERS website at www.vaers. [facility].gov, or by calling 8-725.621.9518. [employer] does not give medical advice. The National Vaccine Injury Compensation Program 
The National Vaccine Injury Compensation Program (VICP) is a federal program that was created to compensate people who may have been injured by certain vaccines. Persons who believe they may have been injured by a vaccine can learn about the program and about filing a claim by calling 5-369.881.5053 or visiting the [employer] website at www.[facility].gov/vaccinecompensation. There is a time limit to file a claim for compensation. How can I learn more? · Ask your healthcare provider. He or she can give you the vaccine package insert or suggest other sources of information. · Call your local or state health department. · Contact the Centers for Disease Control and Prevention (CDC): 
¨ Call 9-353.749.9023 (1-800-CDC-INFO) or ¨ Visit CDC's website at www.cdc.gov/flu Vaccine Information Statement Inactivated Influenza Vaccine 8/7/2015) 42 GITA Lucas 934FT-[address] [facility] and [employer] Centers for Disease Control and Prevention Many Vaccine Information Statements are available in [place] and other languages. See www.immunize.org/vis. Muchas hojas de información sobre vacunas están disponibles en español y en otros idiomas. Visite www.immunize.org/vis. Care instructions adapted under license by [employer] (which disclaims liability or warranty for this information). If you have questions about a medical condition or this instruction, always ask your healthcare professional. [address] any warranty or liability for your use of this information. Introducing [facility] & HEALTH SERVICES! Clara Granger introduces [employer] patient portal. Now you can access parts of your medical record, email your doctor's office, and request medication refills online. 1. In your internet browser, go to [email or web address] 2. Click on the First Time User? Click Here link in the Sign In box. You will see the New Member Sign Up page. 3. Enter your [employer] Access Code exactly as it appears below. You will not need to use this code after youve completed the sign-up process. If you do not sign up before the expiration date, you must request a new code. · [employer] Access Code: [facility] Expires: 2/20/2018  9:02 AM 
 
4. Enter the last four digits of your Social Security Number (xxxx) and Date of Birth (mm/dd/yyyy) as indicated and click Submit. You will be taken to the next sign-up page. 5. Create a [employer]comt ID. This will be your [employer] login ID and cannot be changed, so think of one that is secure and easy to remember. 6. Create a [facility] password. You can change your password at any time. 7. Enter your Password Reset Question and Answer. This can be used at a later time if you forget your password. 8. Enter your e-mail address. You will receive e-mail notification when new information is available in [address] 9. Click Sign Up. You can now view and download portions of your medical record. 10. Click the Download Summary menu link to download a portable copy of your medical information. If you have questions, please visit the Frequently Asked Questions section of the [employer] website. Remember, [employer] is NOT to be used for urgent needs. For medical emergencies, dial 911. Now available from your iPhone and Android! Please provide this summary of care documentation to your next provider. Your primary care clinician is listed as Liat Sousa. If you have any questions after today's visit, please call [phone].

## 2017-12-14 NOTE — PATIENT INSTRUCTIONS

## 2017-12-14 NOTE — PROGRESS NOTES
Chief Complaint   Patient presents with   Leveda Backers Dr. Sandro Rivera Hypertension     Patient presents for High Dose flu vaccine. Consent obtained, Tolerated procedure well at right deltoid. Patient remained in office 15 minutes post vaccine.  No side effects noted    Lot# [id number]  Exp: 05/24/18  [place] [facility]  [address]: [phone]

## 2018-01-01 ENCOUNTER — HOSPITAL ENCOUNTER (OUTPATIENT)
Dept: ONCOLOGY | Age: 83
Discharge: HOME | End: 2018-12-03

## 2018-01-01 ENCOUNTER — ANTI-COAG VISIT (OUTPATIENT)
Dept: ONCOLOGY | Age: 83
End: 2018-01-01

## 2018-01-01 ENCOUNTER — HOSPITAL ENCOUNTER (OUTPATIENT)
Dept: LAB | Age: 83
Discharge: HOME | End: 2018-12-03
Payer: MEDICARE

## 2018-01-01 ENCOUNTER — PATIENT OUTREACH (OUTPATIENT)
Dept: FAMILY MEDICINE CLINIC | Age: 83
End: 2018-01-01

## 2018-01-01 ENCOUNTER — OFFICE VISIT (OUTPATIENT)
Dept: ONCOLOGY | Age: 83
End: 2018-01-01

## 2018-01-01 VITALS
HEART RATE: 58 BPM | SYSTOLIC BLOOD PRESSURE: 143 MMHG | OXYGEN SATURATION: 99 % | HEIGHT: 59 IN | TEMPERATURE: 98.2 F | DIASTOLIC BLOOD PRESSURE: 70 MMHG | RESPIRATION RATE: 16 BRPM

## 2018-01-01 VITALS — DIASTOLIC BLOOD PRESSURE: 82 MMHG | SYSTOLIC BLOOD PRESSURE: 132 MMHG | HEART RATE: 67 BPM

## 2018-01-01 VITALS — SYSTOLIC BLOOD PRESSURE: 180 MMHG | HEART RATE: 57 BPM | DIASTOLIC BLOOD PRESSURE: 82 MMHG

## 2018-01-01 DIAGNOSIS — I82.409 THROMBOEMBOLISM OF DEEP VEINS OF LOWER EXTREMITY, UNSPECIFIED LATERALITY (HCC): Primary | ICD-10-CM

## 2018-01-01 DIAGNOSIS — M17.0 PRIMARY OSTEOARTHRITIS OF BOTH KNEES: ICD-10-CM

## 2018-01-01 DIAGNOSIS — D68.59 THROMBOPHILIA (HCC): ICD-10-CM

## 2018-01-01 DIAGNOSIS — E55.9 VITAMIN D DEFICIENCY: ICD-10-CM

## 2018-01-01 DIAGNOSIS — D50.8 IRON DEFICIENCY ANEMIA SECONDARY TO INADEQUATE DIETARY IRON INTAKE: ICD-10-CM

## 2018-01-01 DIAGNOSIS — I82.402 DEEP VEIN THROMBOSIS (DVT) OF LEFT LOWER EXTREMITY, UNSPECIFIED CHRONICITY, UNSPECIFIED VEIN (HCC): Primary | ICD-10-CM

## 2018-01-01 DIAGNOSIS — I82.402 DEEP VEIN THROMBOSIS (DVT) OF LEFT LOWER EXTREMITY, UNSPECIFIED CHRONICITY, UNSPECIFIED VEIN (HCC): ICD-10-CM

## 2018-01-01 LAB
25(OH)D3 SERPL-MCNC: 73.5 NG/ML (ref 30–100)
ALBUMIN SERPL-MCNC: 2.8 G/DL (ref 3.4–5)
ALBUMIN/GLOB SERPL: 0.7 {RATIO} (ref 0.8–1.7)
ALP SERPL-CCNC: 143 U/L (ref 45–117)
ALT SERPL-CCNC: 7 U/L (ref 13–56)
ANION GAP SERPL CALC-SCNC: 6 MMOL/L (ref 3–18)
AST SERPL-CCNC: 10 U/L (ref 15–37)
BASO+EOS+MONOS # BLD AUTO: 0.7 K/UL (ref 0–2.3)
BASO+EOS+MONOS # BLD AUTO: 8 % (ref 0.1–17)
BILIRUB SERPL-MCNC: 0.3 MG/DL (ref 0.2–1)
BUN SERPL-MCNC: 16 MG/DL (ref 7–18)
BUN/CREAT SERPL: 38 (ref 12–20)
CALCIUM SERPL-MCNC: 8.5 MG/DL (ref 8.5–10.1)
CHLORIDE SERPL-SCNC: 98 MMOL/L (ref 100–108)
CO2 SERPL-SCNC: 34 MMOL/L (ref 21–32)
CREAT SERPL-MCNC: 0.42 MG/DL (ref 0.6–1.3)
DIFFERENTIAL METHOD BLD: ABNORMAL
ERYTHROCYTE [DISTWIDTH] IN BLOOD BY AUTOMATED COUNT: 12.7 % (ref 11.5–14.5)
FERRITIN SERPL-MCNC: 73 NG/ML (ref 8–388)
GLOBULIN SER CALC-MCNC: 3.8 G/DL (ref 2–4)
GLUCOSE SERPL-MCNC: 80 MG/DL (ref 74–99)
HCT VFR BLD AUTO: 36.1 % (ref 36–48)
HGB BLD-MCNC: 11.2 G/DL (ref 12–16)
INR BLD: 1.6 (ref 2–3)
INR BLD: 1.7 (ref 2–3)
INR BLD: 1.9 (ref 2–3)
INR BLD: 2.1 (ref 2–3)
INR BLD: 2.1 (ref 2–3)
INR BLD: 2.3 (ref 2–3)
INR BLD: 2.3 (ref 2–3)
INR BLD: 2.5 (ref 2–3)
INR BLD: 2.6 (ref 2–3)
INR BLD: 3.1 (ref 2–3)
INR BLD: 3.5 (ref 2–3)
INR BLD: 4 (ref 2–3)
IRON SATN MFR SERPL: 16 %
IRON SERPL-MCNC: 41 UG/DL (ref 50–175)
LYMPHOCYTES # BLD: 0.9 K/UL (ref 1.1–5.9)
LYMPHOCYTES NFR BLD: 10 % (ref 14–44)
MCH RBC QN AUTO: 28.2 PG (ref 25–35)
MCHC RBC AUTO-ENTMCNC: 31 G/DL (ref 31–37)
MCV RBC AUTO: 90.9 FL (ref 78–102)
NEUTS SEG # BLD: 7.3 K/UL (ref 1.8–9.5)
NEUTS SEG NFR BLD: 82 % (ref 40–70)
PLATELET # BLD AUTO: 247 K/UL (ref 140–440)
POTASSIUM SERPL-SCNC: 4.3 MMOL/L (ref 3.5–5.5)
PROT SERPL-MCNC: 6.6 G/DL (ref 6.4–8.2)
PT POC: ABNORMAL SECONDS
RBC # BLD AUTO: 3.97 M/UL (ref 4.1–5.1)
SODIUM SERPL-SCNC: 138 MMOL/L (ref 136–145)
TIBC SERPL-MCNC: 258 UG/DL (ref 250–450)
VALID INTERNAL CONTROL?: YES
WBC # BLD AUTO: 8.9 K/UL (ref 4.5–13)

## 2018-01-01 PROCEDURE — 36415 COLL VENOUS BLD VENIPUNCTURE: CPT

## 2018-01-01 PROCEDURE — 80053 COMPREHEN METABOLIC PANEL: CPT

## 2018-01-01 PROCEDURE — 82728 ASSAY OF FERRITIN: CPT

## 2018-01-01 PROCEDURE — 82306 VITAMIN D 25 HYDROXY: CPT

## 2018-01-01 PROCEDURE — 83540 ASSAY OF IRON: CPT

## 2018-01-01 RX ORDER — WARFARIN SODIUM 5 MG/1
TABLET ORAL
Qty: 30 TAB | Refills: 11 | Status: SHIPPED | OUTPATIENT
Start: 2018-01-01

## 2018-01-01 RX ORDER — WARFARIN 1 MG/1
TABLET ORAL
Qty: 150 TAB | Refills: 10 | Status: SHIPPED | OUTPATIENT
Start: 2018-01-01 | End: 2019-01-01 | Stop reason: SDUPTHER

## 2018-01-22 ENCOUNTER — TELEPHONE ANTICOAG (OUTPATIENT)
Dept: ONCOLOGY | Age: 83
End: 2018-01-22

## 2018-01-22 DIAGNOSIS — D68.59 THROMBOPHILIA (HCC): ICD-10-CM

## 2018-01-22 LAB — INR, EXTERNAL: 2.4

## 2018-01-23 ENCOUNTER — PATIENT OUTREACH (OUTPATIENT)
Dept: FAMILY MEDICINE CLINIC | Age: 83
End: 2018-01-23

## 2018-01-23 NOTE — PROGRESS NOTES
Call placed to patient spoke with patient's daughter. She stated she could not really talk right now she was getting her mother up and we agreed to talk at about 15. Patient's daughter did say that personal touch nurse had been out yesterday. [address] unable to get back in touch with patient daughter today will attempt tomorrow.

## 2018-01-30 ENCOUNTER — TELEPHONE (OUTPATIENT)
Dept: ONCOLOGY | Age: 83
End: 2018-01-30

## 2018-01-30 ENCOUNTER — DOCUMENTATION ONLY (OUTPATIENT)
Dept: ONCOLOGY | Age: 83
End: 2018-01-30

## 2018-01-30 NOTE — PROGRESS NOTES
Petey Talbert called to report INR 1.5 and patient is on Coumadin 3 mg daily. Instructed patients nurse to increase coumadin to 4 mg daily and repeat INR in 3 days.

## 2018-02-01 ENCOUNTER — OFFICE VISIT (OUTPATIENT)
Dept: FAMILY MEDICINE CLINIC | Age: 83
End: 2018-02-01

## 2018-02-01 DIAGNOSIS — I10 ESSENTIAL HYPERTENSION: ICD-10-CM

## 2018-02-01 DIAGNOSIS — E66.01 MORBID OBESITY (HCC): ICD-10-CM

## 2018-02-01 DIAGNOSIS — A80.9 POLIO: ICD-10-CM

## 2018-02-01 DIAGNOSIS — I50.31 ACUTE DIASTOLIC CHF (CONGESTIVE HEART FAILURE) (HCC): Primary | ICD-10-CM

## 2018-02-01 DIAGNOSIS — I82.409 THROMBOEMBOLISM OF DEEP VEINS OF LOWER EXTREMITY, UNSPECIFIED LATERALITY (HCC): ICD-10-CM

## 2018-02-01 DIAGNOSIS — G47.33 OSA (OBSTRUCTIVE SLEEP APNEA): ICD-10-CM

## 2018-02-01 RX ORDER — LANOLIN ALCOHOL/MO/W.PET/CERES
325 CREAM (GRAM) TOPICAL
COMMUNITY
Start: 2018-01-18

## 2018-02-01 RX ORDER — LANOLIN ALCOHOL/MO/W.PET/CERES
1000 CREAM (GRAM) TOPICAL
COMMUNITY
Start: 2018-01-18

## 2018-02-01 RX ORDER — METOPROLOL TARTRATE 50 MG/1
TABLET ORAL
Refills: 0 | COMMUNITY
Start: 2018-01-20

## 2018-02-01 RX ORDER — ZINC GLUCONATE 50 MG
TABLET ORAL
Refills: 0 | COMMUNITY
Start: 2018-01-20

## 2018-02-01 RX ORDER — VALSARTAN 160 MG/1
160 TABLET ORAL
COMMUNITY
Start: 2018-01-19 | End: 2018-08-03 | Stop reason: SDUPTHER

## 2018-02-01 RX ORDER — FERROUS SULFATE 325(65) MG
TABLET, DELAYED RELEASE (ENTERIC COATED) ORAL
Refills: 0 | COMMUNITY
Start: 2018-01-20

## 2018-02-01 RX ORDER — FUROSEMIDE 40 MG/1
40 TABLET ORAL
COMMUNITY
Start: 2018-01-18

## 2018-02-01 RX ORDER — PANTOPRAZOLE SODIUM 20 MG/1
20 TABLET, DELAYED RELEASE ORAL
COMMUNITY
Start: 2017-10-12

## 2018-02-01 RX ORDER — ACETAMINOPHEN 500 MG
500 TABLET ORAL
COMMUNITY

## 2018-02-01 NOTE — MR AVS SNAPSHOT
[address] [facility] [address] 706 [facility] 
[phone] Patient: Pako Kelly MRN: [id number] SYY:[birth year]17 Visit Information Date & Time Provider Department Dept. Phone Encounter #  
 2/1/2018  8:45 AM Casie Mak, 503 [facility] [id number] Follow-up Instructions Return w/ new PCP. Your Appointments 2/14/2018 10:00 AM  
Office Visit with Alee Colby MD  
[address]ane  Oncology [facility]) Appt Note: 12 weeks [facility] 207, [place] 290 [place] 2000 [address] [facility] 250 [facility]  
  
   
 [address] [place] [address] [phone] [address] [address]  
  
    
 3/15/2018 10:30 AM  
ROUTINE CARE with Casie Mak MD  
[address] [place] [address] ([facility]) Appt Note: routine f/u 5mo; routine f/u 3 mo [address] [facility] [address] [place] [address] U. [address]. [address] Upcoming Health Maintenance Date Due  
 MEDICARE YEARLY EXAM 7/21/2018 GLAUCOMA SCREENING Q2Y 11/20/2019 DTaP/Tdap/Td series (2 - Td) 7/5/2026 Allergies as of 2/1/2018  Review Complete On: 2/1/2018 By: Dre Nails, LPN Severity Noted Reaction Type Reactions Lipitor [Atorvastatin]  07/22/2013   Side Effect Myalgia Current Immunizations  Reviewed on 9/22/2014 Name Date Influenza High Dose Vaccine PF 12/14/2017, 1/17/2017 Influenza Vaccine PF 9/22/2014 Pneumococcal Conjugate (PCV-13) 7/5/2016 Pneumococcal Polysaccharide (PPSV-23) 9/19/2013 Not reviewed this visit Vitals BP Pulse Temp Resp Height(growth percentile) SpO2  
 120/70 (BP 1 Location: Left arm, BP Patient Position: Sitting) 67 98 °F (36.7 °C) (Oral) 16 4' 11\" (1.499 m) (!) 85% OB Status Smoking Status Postmenopausal Never Smoker Preferred Pharmacy Pharmacy Name Phone [id number] [place], [address] [place] [address] [phone] Your Updated Medication List  
  
   
This list is accurate as of: 2/1/18  8:57 AM.  Always use your most recent med list.  
  
  
  
  
 acetaminophen 500 mg tablet Commonly known as:  TYLENOL  
500 mg.  
  
 albuterol 90 mcg/actuation inhaler Commonly known as:  PROVENTIL HFA, VENTOLIN HFA, PROAIR HFA Take 2 Puffs by inhalation every four (4) hours as needed for Wheezing. atenolol 50 mg tablet Commonly known as:  TENORMIN  
TAKE ONE TABLET BY MOUTH ONE TIME DAILY  
  
 cetirizine 10 mg tablet Commonly known as:  ZYRTEC Take 1 Tab by mouth daily. ergocalciferol 50,000 unit capsule Commonly known as:  ERGOCALCIFEROL Take 1 Cap by mouth every seven (7) days. Indications: Vitamin D Deficiency * ferrous sulfate 325 mg (65 mg iron) tablet 325 mg.  
  
 * ferrous sulfate 325 mg (65 mg iron) EC tablet Commonly known as:  IRON  
  
 fluticasone 50 mcg/actuation nasal spray Commonly known as:  FLONASE  
2 sprays each nostril once daily  
  
 folic acid-vit M1-KOC S10 2.5-25-2 mg tablet Commonly known as:  Asmita Krista Take 1 Tab by Mouth Once a Day. furosemide 40 mg tablet Commonly known as:  LASIX 40 mg. KLOR-CON M20 20 mEq tablet Generic drug:  potassium chloride TAKE ONE TABLET BY MOUTH TWICE DAILY  
  
 magnesium oxide 400 mg tablet Commonly known as:  MAG-OX Take 1 Tab by mouth daily. metoprolol tartrate 50 mg tablet Commonly known as:  LOPRESSOR  
  
 miscellaneous medical supply Misc Please extend/provide mobile wheelchair  Dx: polio contracture  
  
 pantoprazole 20 mg tablet Commonly known as:  PROTONIX  
20 mg.  
  
 pravastatin 40 mg tablet Commonly known as:  PRAVACHOL  
TAKE 1  TABLET BY MOUTH EVERY EVENING  
  
 traMADol 50 mg tablet Commonly known as:  Rigo Wills  
 TAKE ONE TABLET BY MOUTH EVERY 6 HOURS AS NEEDED FOR PAIN  
  
 valsartan 160 mg tablet Commonly known as:  DIOVAN  
160 mg.  
  
 valsartan-hydroCHLOROthiazide 160-12.5 mg per tablet Commonly known as:  DIOVAN-HCT  
TAKE 1 TABLET BY ORAL ROUTE ONCE DAILY * cyanocobalamin 1,000 mcg tablet  
1,000 mcg. * VITAMIN B-12 1,000 mcg tablet Generic drug:  cyanocobalamin * warfarin 5 mg tablet Commonly known as:  COUMADIN  
TAKE 1 TABLET (5 MG) BY ORAL ROUTE ONCE DAILY * warfarin 1 mg tablet Commonly known as:  COUMADIN Take 1-5 tablets by mouth daily pending the INR values * Notice: This list has 6 medication(s) that are the same as other medications prescribed for you. Read the directions carefully, and ask your doctor or other care provider to review them with you. Follow-up Instructions Return w/ new PCP. Patient Instructions Heart Failure: Care Instructions Your Care Instructions Heart failure occurs when your heart does not pump as much blood as the body needs. Failure does not mean that the heart has stopped pumping but rather that it is not pumping as well as it should. Over time, this causes fluid buildup in your lungs and other parts of your body. Fluid buildup can cause shortness of breath, fatigue, swollen ankles, and other problems. By taking medicines regularly, reducing sodium (salt) in your diet, checking your weight every day, and making lifestyle changes, you can feel better and live longer. Follow-up care is a key part of your treatment and safety. Be sure to make and go to all appointments, and call your doctor if you are having problems. It's also a good idea to know your test results and keep a list of the medicines you take. How can you care for yourself at home? Medicines ? · Be safe with medicines. Take your medicines exactly as prescribed. Call your doctor if you think you are having a problem with your medicine. ? · Do not take any vitamins, over-the-counter medicine, or herbal products without talking to your doctor first. Olesya Chapman not take ibuprofen (Advil or Motrin) and naproxen (Aleve) without talking to your doctor first. They could make your heart failure worse. ? · You may be taking some of the following medicine. ¨ Beta-blockers can slow heart rate, decrease blood pressure, and improve your condition. Taking a beta-blocker may lower your chance of needing to be hospitalized. ¨ Angiotensin-converting enzyme inhibitors (ACEIs) reduce the heart's workload, lower blood pressure, and reduce swelling. Taking an ACEI may lower your chance of needing to be hospitalized again. ¨ Angiotensin II receptor blockers (ARBs) work like ACEIs. Your doctor may prescribe them instead of ACEIs. ¨ Diuretics, also called water pills, reduce swelling. ¨ Potassium supplements replace this important mineral, which is sometimes lost with diuretics. ¨ Aspirin and other blood thinners prevent blood clots, which can cause a stroke or heart attack. ? You will get more details on the specific medicines your doctor prescribes. Diet ? · Your doctor may suggest that you limit sodium to 2,000 milligrams (mg) a day or less. That is less than 1 teaspoon of salt a day, including all the salt you eat in cooking or in packaged foods. People get most of their sodium from processed foods. Fast food and restaurant meals also tend to be very high in sodium. ? · Ask your doctor how much liquid you can drink each day. You may have to limit liquids. ?Weight ? · Weigh yourself without clothing at the same time each day. Record your weight. Call your doctor if you have a sudden weight gain, such as more than 2 to 3 pounds in a day or 5 pounds in a week. (Your doctor may suggest a different range of weight gain.) A sudden weight gain may mean that your heart failure is getting worse. ? Activity level ? · Start light exercise (if your doctor says it is okay). Even if you can only do a small amount, exercise will help you get stronger, have more energy, and manage your weight and your stress. Walking is an easy way to get exercise. Start out by walking a little more than you did before. Bit by bit, increase the amount you walk. ? · When you exercise, watch for signs that your heart is working too hard. You are pushing yourself too hard if you cannot talk while you are exercising. If you become short of breath or dizzy or have chest pain, stop, sit down, and rest.  
? · If you feel \"wiped out\" the day after you exercise, walk slower or for a shorter distance until you can work up to a better pace. ? · Get enough rest at night. Sleeping with 1 or 2 pillows under your upper body and head may help you breathe easier. ? Lifestyle changes ? · Do not smoke. Smoking can make a heart condition worse. If you need help quitting, talk to your doctor about stop-smoking programs and medicines. These can increase your chances of quitting for good. Quitting smoking may be the most important step you can take to protect your heart. ? · Limit alcohol to 2 drinks a day for men and 1 drink a day for women. Too much alcohol can cause health problems. ? · Avoid getting sick from colds and the flu. Get a pneumococcal vaccine shot. If you have had one before, ask your doctor whether you need another dose. Get a flu shot each year. If you must be around people with colds or the flu, wash your hands often. When should you call for help? Call 911 if you have symptoms of sudden heart failure such as: 
? · You have severe trouble breathing. ? · You cough up pink, foamy mucus. ? · You have a new irregular or rapid heartbeat. ?Call your doctor now or seek immediate medical care if: 
? · You have new or increased shortness of breath. ? · You are dizzy or lightheaded, or you feel like you may faint. ? · You have sudden weight gain, such as more than 2 to 3 pounds in a day or 5 pounds in a week. (Your doctor may suggest a different range of weight gain.) ? · You have increased swelling in your legs, ankles, or feet. ? · You are suddenly so tired or weak that you cannot do your usual activities. ? Watch closely for changes in your health, and be sure to contact your doctor if you develop new symptoms. Where can you learn more? Go to http://lawrence-yennifer.info/. Enter V155 in the search box to learn more about \"Heart Failure: Care Instructions. \" Current as of: September 21, 2016 Content Version: 11.4 © 9646-8967 "[employer]. Care instructions adapted under license by [employer] (which disclaims liability or warranty for this information). If you have questions about a medical condition or this instruction, always ask your healthcare professional. [place] [address] any warranty or liability for your use of this information. Introducing [facility]! [employer] introduces [employer] patient portal. Now you can access parts of your medical record, email your doctor's office, and request medication refills online. 1. In your internet browser, go to [email or web address] 2. Click on the First Time User? Click Here link in the Sign In box. You will see the New Member Sign Up page. 3. Enter your [employer] Access Code exactly as it appears below. You will not need to use this code after youve completed the sign-up process. If you do not sign up before the expiration date, you must request a new code. · [employer] Access Code: [facility] Expires: 2/20/2018  9:02 AM 
 
4. Enter the last four digits of your Social Security Number (xxxx) and Date of Birth (mm/dd/yyyy) as indicated and click Submit. You will be taken to the next sign-up page. 5. Create a [employer] ID.  This will be your [employer] login ID and cannot be changed, so think of one that is secure and easy to remember. 6. Create a [employer] password. You can change your password at any time. 7. Enter your Password Reset Question and Answer. This can be used at a later time if you forget your password. 8. Enter your e-mail address. You will receive e-mail notification when new information is available in [address] 9. Click Sign Up. You can now view and download portions of your medical record. 10. Click the Download Summary menu link to download a portable copy of your medical information. If you have questions, please visit the Frequently Asked Questions section of the [employer] website. Remember, [employer] is NOT to be used for urgent needs. For medical emergencies, dial 911. Now available from your iPhone and Android! Please provide this summary of care documentation to your next provider. Your primary care clinician is listed as Mookie Luevano. If you have any questions after today's visit, please call [phone].

## 2018-02-01 NOTE — PATIENT INSTRUCTIONS
Heart Failure: Care Instructions  Your Care Instructions    Heart failure occurs when your heart does not pump as much blood as the body needs. Failure does not mean that the heart has stopped pumping but rather that it is not pumping as well as it should. Over time, this causes fluid buildup in your lungs and other parts of your body. Fluid buildup can cause shortness of breath, fatigue, swollen ankles, and other problems. By taking medicines regularly, reducing sodium (salt) in your diet, checking your weight every day, and making lifestyle changes, you can feel better and live longer. Follow-up care is a key part of your treatment and safety. Be sure to make and go to all appointments, and call your doctor if you are having problems. It's also a good idea to know your test results and keep a list of the medicines you take. How can you care for yourself at home? Medicines  ? · Be safe with medicines. Take your medicines exactly as prescribed. Call your doctor if you think you are having a problem with your medicine. ? · Do not take any vitamins, over-the-counter medicine, or herbal products without talking to your doctor first. Sourav Terrazas not take ibuprofen (Advil or Motrin) and naproxen (Aleve) without talking to your doctor first. They could make your heart failure worse. ? · You may be taking some of the following medicine. ¨ Beta-blockers can slow heart rate, decrease blood pressure, and improve your condition. Taking a beta-blocker may lower your chance of needing to be hospitalized. ¨ Angiotensin-converting enzyme inhibitors (ACEIs) reduce the heart's workload, lower blood pressure, and reduce swelling. Taking an ACEI may lower your chance of needing to be hospitalized again. ¨ Angiotensin II receptor blockers (ARBs) work like ACEIs. Your doctor may prescribe them instead of ACEIs. ¨ Diuretics, also called water pills, reduce swelling.   ¨ Potassium supplements replace this important mineral, which is sometimes lost with diuretics. ¨ Aspirin and other blood thinners prevent blood clots, which can cause a stroke or heart attack. ? You will get more details on the specific medicines your doctor prescribes. Diet  ? · Your doctor may suggest that you limit sodium to 2,000 milligrams (mg) a day or less. That is less than 1 teaspoon of salt a day, including all the salt you eat in cooking or in packaged foods. People get most of their sodium from processed foods. Fast food and restaurant meals also tend to be very high in sodium. ? · Ask your doctor how much liquid you can drink each day. You may have to limit liquids. ?Weight  ? · Weigh yourself without clothing at the same time each day. Record your weight. Call your doctor if you have a sudden weight gain, such as more than 2 to 3 pounds in a day or 5 pounds in a week. (Your doctor may suggest a different range of weight gain.) A sudden weight gain may mean that your heart failure is getting worse. ? Activity level  ? · Start light exercise (if your doctor says it is okay). Even if you can only do a small amount, exercise will help you get stronger, have more energy, and manage your weight and your stress. Walking is an easy way to get exercise. Start out by walking a little more than you did before. Bit by bit, increase the amount you walk. ? · When you exercise, watch for signs that your heart is working too hard. You are pushing yourself too hard if you cannot talk while you are exercising. If you become short of breath or dizzy or have chest pain, stop, sit down, and rest.   ? · If you feel \"wiped out\" the day after you exercise, walk slower or for a shorter distance until you can work up to a better pace. ? · Get enough rest at night. Sleeping with 1 or 2 pillows under your upper body and head may help you breathe easier. ? Lifestyle changes  ? · Do not smoke. Smoking can make a heart condition worse.  If you need help quitting, talk to your doctor about stop-smoking programs and medicines. These can increase your chances of quitting for good. Quitting smoking may be the most important step you can take to protect your heart. ? · Limit alcohol to 2 drinks a day for men and 1 drink a day for women. Too much alcohol can cause health problems. ? · Avoid getting sick from colds and the flu. Get a pneumococcal vaccine shot. If you have had one before, ask your doctor whether you need another dose. Get a flu shot each year. If you must be around people with colds or the flu, wash your hands often. When should you call for help? Call 911 if you have symptoms of sudden heart failure such as:  ? · You have severe trouble breathing. ? · You cough up pink, foamy mucus. ? · You have a new irregular or rapid heartbeat. ?Call your doctor now or seek immediate medical care if:  ? · You have new or increased shortness of breath. ? · You are dizzy or lightheaded, or you feel like you may faint. ? · You have sudden weight gain, such as more than 2 to 3 pounds in a day or 5 pounds in a week. (Your doctor may suggest a different range of weight gain.)   ? · You have increased swelling in your legs, ankles, or feet. ? · You are suddenly so tired or weak that you cannot do your usual activities. ? Watch closely for changes in your health, and be sure to contact your doctor if you develop new symptoms. Where can you learn more? Go to http://lawrence-yennifer.info/. Enter R617 in the search box to learn more about \"Heart Failure: Care Instructions. \"  Current as of: September 21, 2016  Content Version: 11.4  © 5199-6842 [email or web address]. Care instructions adapted under license by [employer] (which disclaims liability or warranty for this information).  If you have questions about a medical condition or this instruction, always ask your healthcare professional. [address]  any warranty or liability for your use of this information.

## 2018-02-02 ENCOUNTER — DOCUMENTATION ONLY (OUTPATIENT)
Dept: ONCOLOGY | Age: 83
End: 2018-02-02

## 2018-02-02 VITALS
DIASTOLIC BLOOD PRESSURE: 70 MMHG | TEMPERATURE: 98 F | HEIGHT: 59 IN | SYSTOLIC BLOOD PRESSURE: 120 MMHG | HEART RATE: 67 BPM | OXYGEN SATURATION: 90 % | RESPIRATION RATE: 16 BRPM

## 2018-02-02 NOTE — PROGRESS NOTES
INR 1.3 today, missed a dose of Tuesday and on Wednesday and Thursday took 4 mg. I advised HHN Arrie Lennox with personal Touch) to have patient increase dose to 6mg daily and repeat INR in 1 week.

## 2018-02-02 NOTE — PROGRESS NOTES
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 Presented with increasing SOB, and leg edema. Pt has h/o HTN,  Morbid obesity, h/o DVT on anticoag, polio, sedentary gets around on wheelchair. Pt was diuresed and supportive care given, concern for LUANA and recommended Bipap which she has refused. She has upcoming appt with sleep and cardiology specialists already scheduled for next Wednesday. Discharge labs show normal creatinine and chronic macrocytic anemia. Overall she is feeling better, leg swelling and breathing has improved. Daughter expressed dissatisfaction in care with me and states has found a new PCP. 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      folic acid-vit I2-RDJ K68 (FOLTX) 2.5-25-2 mg tablet Take 1 Tab by Mouth Once a Day.  ferrous sulfate 325 mg (65 mg iron) tablet 325 mg.  pantoprazole (PROTONIX) 20 mg tablet 20 mg.      valsartan (DIOVAN) 160 mg tablet 160 mg.      acetaminophen (TYLENOL) 500 mg tablet 500 mg.  cyanocobalamin 1,000 mcg tablet 1,000 mcg.       ferrous sulfate (IRON) 325 mg (65 mg iron) EC tablet   0    metoprolol tartrate (LOPRESSOR) 50 mg tablet   0    atenolol (TENORMIN) 50 mg tablet TAKE ONE TABLET BY MOUTH ONE TIME DAILY  90 Tab 0    KLOR-CON M20 20 mEq tablet TAKE ONE TABLET BY MOUTH TWICE DAILY  60 Tab 0    ergocalciferol (ERGOCALCIFEROL) 50,000 unit capsule Take 1 Cap by mouth every seven (7) days. Indications: Vitamin D Deficiency 12 Cap 3    warfarin (COUMADIN) 1 mg tablet Take 1-5 tablets by mouth daily pending the INR values 150 Tab 11    traMADol (ULTRAM) 50 mg tablet TAKE ONE TABLET BY MOUTH EVERY 6 HOURS AS NEEDED FOR PAIN 60 Tab 0    pravastatin (PRAVACHOL) 40 mg tablet TAKE 1  TABLET BY MOUTH EVERY EVENING 90 Tab 3    magnesium oxide (MAG-OX) 400 mg tablet Take 1 Tab by mouth daily. 90 Tab 3    warfarin (COUMADIN) 5 mg tablet TAKE 1 TABLET (5 MG) BY ORAL ROUTE ONCE DAILY 30 Tab 11    albuterol (PROVENTIL HFA, VENTOLIN HFA, PROAIR HFA) 90 mcg/actuation inhaler Take 2 Puffs by inhalation every four (4) hours as needed for Wheezing. 1 Inhaler 0    fluticasone (FLONASE) 50 mcg/actuation nasal spray 2 sprays each nostril once daily 1 Bottle 0    cetirizine (ZYRTEC) 10 mg tablet Take 1 Tab by mouth daily.  30 Tab 11    miscellaneous medical supply misc Please extend/provide mobile wheelchair  Dx: polio contracture 1 Each 0    valsartan-hydroCHLOROthiazide (DIOVAN-HCT) 160-12.5 mg per tablet TAKE 1 TABLET BY ORAL ROUTE ONCE DAILY 30 Tab 10       Allergies   Allergen Reactions    Lipitor [Atorvastatin] Myalgia       Past Medical History:   Diagnosis Date    Diverticulosis     Hypercholesterolemia     Hypertension     Polio     Thromboembolus ([facility] [address] 2012    Left leg, indefinite anticoag Dr. Benn Nyhan NR goal 1.5-2       Social History     Social History    Marital status:      Spouse name: N/A    Number of children: N/A    Years of education: N/A     Occupational History    n/a      Social History Main Topics    Smoking status: Never Smoker    Smokeless tobacco: Never Used    Alcohol use No    Drug use: Not on file    Sexual activity: Not Currently     Partners: Male     Other Topics Concern    Not on file     Social History Narrative       Family History   Problem Relation Age of Onset    Asthma Father          OBJECTIVE    Physical Exam:     Visit Vitals    /70 (BP 1 Location: Left arm, BP Patient Position: Sitting)    Pulse 67    Temp 98 °F (36.7 °C) (Oral)    Resp 16    Ht 4' 11\" (1.499 m)    SpO2 90%       General: alert, chronically ill-appearing, wheelchair in no apparent distress or pain  CVS: normal rate, regular rhythm  Lungs:clear to ausculation bilaterally, no crackles, wheezing or rhonchi noted  Abdomen: normoactive bowel sounds, soft, non-tender  Extremities: no edema, atrophic LLE  Skin: warm, no lesions, rashes noted  Psych:  mood and affect normal        ASSESSMENT/PLAN  Diagnoses and all orders for this visit:    1. Acute diastolic CHF (congestive heart failure) (HCC)  Clinically improved  On arb, bb  Has upcoming appt with cards     2. Morbid obesity ([address]    3. Essential hypertension  controlled  4. LUANA (obstructive sleep apnea)  Upcoming appt with sleep specialist    5. 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 Polio        Follow-up Disposition:  Return w/ new PCP. Patient understands plan of care. Patient has provided input and agrees with goals.

## 2018-02-12 ENCOUNTER — TELEPHONE ANTICOAG (OUTPATIENT)
Dept: ONCOLOGY | Age: 83
End: 2018-02-12

## 2018-02-12 DIAGNOSIS — D68.59 THROMBOPHILIA (HCC): ICD-10-CM

## 2018-02-12 RX ORDER — VALSARTAN AND HYDROCHLOROTHIAZIDE 160; 12.5 MG/1; MG/1
TABLET, FILM COATED ORAL
Qty: 30 TAB | Refills: 9 | OUTPATIENT
Start: 2018-02-12

## 2018-02-14 ENCOUNTER — HOSPITAL ENCOUNTER (OUTPATIENT)
Dept: LAB | Age: 83
Discharge: HOME | End: 2018-02-14
Payer: MEDICARE

## 2018-02-14 ENCOUNTER — HOSPITAL ENCOUNTER (OUTPATIENT)
Dept: ONCOLOGY | Age: 83
Discharge: HOME | End: 2018-02-14

## 2018-02-14 ENCOUNTER — OFFICE VISIT (OUTPATIENT)
Dept: ONCOLOGY | Age: 83
End: 2018-02-14

## 2018-02-14 VITALS — DIASTOLIC BLOOD PRESSURE: 81 MMHG | HEART RATE: 62 BPM | SYSTOLIC BLOOD PRESSURE: 130 MMHG | TEMPERATURE: 98.6 F

## 2018-02-14 DIAGNOSIS — I82.402: ICD-10-CM

## 2018-02-14 DIAGNOSIS — E55.9 VITAMIN D DEFICIENCY: ICD-10-CM

## 2018-02-14 DIAGNOSIS — I82.402: Primary | ICD-10-CM

## 2018-02-14 DIAGNOSIS — D50.8 IRON DEFICIENCY ANEMIA SECONDARY TO INADEQUATE DIETARY IRON INTAKE: ICD-10-CM

## 2018-02-14 DIAGNOSIS — D68.59 THROMBOPHILIA (HCC): ICD-10-CM

## 2018-02-14 DIAGNOSIS — M17.0 PRIMARY OSTEOARTHRITIS OF BOTH KNEES: ICD-10-CM

## 2018-02-14 DIAGNOSIS — D72.9 NEUTROPHILIA: ICD-10-CM

## 2018-02-14 LAB
25(OH)D3 SERPL-MCNC: 35.3 NG/ML (ref 30–100)
ALBUMIN SERPL-MCNC: 3 G/DL (ref 3.4–5)
ALBUMIN/GLOB SERPL: 0.8 {RATIO} (ref 0.8–1.7)
ALP SERPL-CCNC: 119 U/L (ref 45–117)
ALT SERPL-CCNC: 10 U/L (ref 13–56)
ANION GAP SERPL CALC-SCNC: 5 MMOL/L (ref 3–18)
AST SERPL-CCNC: 13 U/L (ref 15–37)
BASO+EOS+MONOS # BLD AUTO: 0.5 K/UL (ref 0–2.3)
BASO+EOS+MONOS # BLD AUTO: 5 % (ref 0.1–17)
BILIRUB SERPL-MCNC: 0.5 MG/DL (ref 0.2–1)
BUN SERPL-MCNC: 24 MG/DL (ref 7–18)
BUN/CREAT SERPL: 47 (ref 12–20)
CALCIUM SERPL-MCNC: 8.6 MG/DL (ref 8.5–10.1)
CHLORIDE SERPL-SCNC: 101 MMOL/L (ref 100–108)
CO2 SERPL-SCNC: 35 MMOL/L (ref 21–32)
CREAT SERPL-MCNC: 0.51 MG/DL (ref 0.6–1.3)
DIFFERENTIAL METHOD BLD: ABNORMAL
ERYTHROCYTE [DISTWIDTH] IN BLOOD BY AUTOMATED COUNT: 16.3 % (ref 11.5–14.5)
FERRITIN SERPL-MCNC: 69 NG/ML (ref 8–388)
GLOBULIN SER CALC-MCNC: 4 G/DL (ref 2–4)
GLUCOSE SERPL-MCNC: 77 MG/DL (ref 74–99)
HCT VFR BLD AUTO: 37 % (ref 36–48)
HGB BLD-MCNC: 11.6 G/DL (ref 12–16)
IRON SATN MFR SERPL: 11 %
IRON SERPL-MCNC: 28 UG/DL (ref 50–175)
LYMPHOCYTES # BLD: 1.2 K/UL (ref 1.1–5.9)
LYMPHOCYTES NFR BLD: 11 % (ref 14–44)
MCH RBC QN AUTO: 33.9 PG (ref 25–35)
MCHC RBC AUTO-ENTMCNC: 31.4 G/DL (ref 31–37)
MCV RBC AUTO: 108.2 FL (ref 78–102)
NEUTS SEG # BLD: 9.8 K/UL (ref 1.8–9.5)
NEUTS SEG NFR BLD: 85 % (ref 40–70)
PLATELET # BLD AUTO: 227 K/UL (ref 140–440)
POTASSIUM SERPL-SCNC: 4.7 MMOL/L (ref 3.5–5.5)
PROT SERPL-MCNC: 7 G/DL (ref 6.4–8.2)
RBC # BLD AUTO: 3.42 M/UL (ref 4.1–5.1)
SODIUM SERPL-SCNC: 141 MMOL/L (ref 136–145)
TIBC SERPL-MCNC: 254 UG/DL (ref 250–450)
WBC # BLD AUTO: 11.5 K/UL (ref 4.5–13)

## 2018-02-14 PROCEDURE — 36415 COLL VENOUS BLD VENIPUNCTURE: CPT | Performed by: INTERNAL MEDICINE

## 2018-02-14 PROCEDURE — 82306 VITAMIN D 25 HYDROXY: CPT | Performed by: INTERNAL MEDICINE

## 2018-02-14 PROCEDURE — 83540 ASSAY OF IRON: CPT | Performed by: INTERNAL MEDICINE

## 2018-02-14 PROCEDURE — 80053 COMPREHEN METABOLIC PANEL: CPT | Performed by: INTERNAL MEDICINE

## 2018-02-14 PROCEDURE — 82728 ASSAY OF FERRITIN: CPT | Performed by: INTERNAL MEDICINE

## 2018-02-14 NOTE — MR AVS SNAPSHOT
[address] [address] [address] [place] [address] 
[phone] Patient: Leidy Garcia MRN: [id number] [facility]:6/17/3147 Visit Information Date & Time Provider Department Dept. Phone Encounter #  
 2/14/2018 10:00 AM Kasey Allen MD [facility] Medical Oncology [phone] [id number] Follow-up Instructions Return in about 3 months (around 5/14/2018). Your Appointments 5/16/2018 10:30 AM  
Office Visit with Kasey Allen MD  
[facility]ia [facility]) Appt Note: 3 month  
 [address] 152 [facility] 3200 [facility], [address] [place] [address] Upcoming Health Maintenance Date Due  
 MEDICARE YEARLY EXAM 7/21/2018 GLAUCOMA SCREENING Q2Y 11/20/2019 DTaP/Tdap/Td series (2 - Td) 7/5/2026 Allergies as of 2/14/2018  Review Complete On: 2/14/2018 By: Kasey Allen MD  
  
 Severity Noted Reaction Type Reactions Lipitor [Atorvastatin]  07/22/2013   Side Effect Myalgia Current Immunizations  Reviewed on 9/22/2014 Name Date Influenza High Dose Vaccine PF 12/14/2017, 1/17/2017 Influenza Vaccine PF 9/22/2014 Pneumococcal Conjugate (PCV-13) 7/5/2016 Pneumococcal Polysaccharide (PPSV-23) 9/19/2013 Not reviewed this visit You Were Diagnosed With   
  
 Codes Comments Thromboembolism of deep lower extremity veins, left (HCC)    -  Primary ICD-10-CM: U43.204 ICD-9-CM: 453.40 Thrombophilia ([facility] [address]     ICD-10-CM: I58.62 
ICD-9-CM: 289.81 Neutrophilia     ICD-10-CM: D72.9 ICD-9-CM: 013. 8 Vitamin D deficiency     ICD-10-CM: E55.9 ICD-9-CM: 268.9 Primary osteoarthritis of both knees     ICD-10-CM: M17.0 ICD-9-CM: 715.16 Iron deficiency anemia secondary to inadequate dietary iron intake     ICD-10-CM: D50.8 ICD-9-CM: 280.1 Vitals BP Pulse Temp OB Status Smoking Status 130/81 62 98.6 °F (37 °C) (Oral) Postmenopausal Never Smoker Preferred Pharmacy Pharmacy Name Phone [id number] - [place], [address] [place] [address] [phone] Your Updated Medication List  
  
   
This list is accurate as of: 2/14/18 10:36 AM.  Always use your most recent med list.  
  
  
  
  
 acetaminophen 500 mg tablet Commonly known as:  TYLENOL  
500 mg.  
  
 albuterol 90 mcg/actuation inhaler Commonly known as:  PROVENTIL HFA, VENTOLIN HFA, PROAIR HFA Take 2 Puffs by inhalation every four (4) hours as needed for Wheezing. atenolol 50 mg tablet Commonly known as:  TENORMIN  
TAKE ONE TABLET BY MOUTH ONE TIME DAILY  
  
 cetirizine 10 mg tablet Commonly known as:  ZYRTEC Take 1 Tab by mouth daily. ergocalciferol 50,000 unit capsule Commonly known as:  ERGOCALCIFEROL Take 1 Cap by mouth every seven (7) days. Indications: Vitamin D Deficiency * ferrous sulfate 325 mg (65 mg iron) tablet 325 mg.  
  
 * ferrous sulfate 325 mg (65 mg iron) EC tablet Commonly known as:  IRON  
  
 fluticasone 50 mcg/actuation nasal spray Commonly known as:  FLONASE  
2 sprays each nostril once daily  
  
 folic acid-vit H8-OVX S97 2.5-25-2 mg tablet Commonly known as:  Nya Maidens Take 1 Tab by Mouth Once a Day. furosemide 40 mg tablet Commonly known as:  LASIX 40 mg. KLOR-CON M20 20 mEq tablet Generic drug:  potassium chloride TAKE ONE TABLET BY MOUTH TWICE DAILY  
  
 magnesium oxide 400 mg tablet Commonly known as:  MAG-OX Take 1 Tab by mouth daily. metoprolol tartrate 50 mg tablet Commonly known as:  LOPRESSOR  
  
 miscellaneous medical supply Misc Please extend/provide mobile wheelchair  Dx: polio contracture  
  
 pantoprazole 20 mg tablet Commonly known as:  PROTONIX  
20 mg.  
  
 pravastatin 40 mg tablet Commonly known as:  PRAVACHOL  
 TAKE 1  TABLET BY MOUTH EVERY EVENING  
  
 traMADol 50 mg tablet Commonly known as:  ULTRAM  
TAKE ONE TABLET BY MOUTH EVERY 6 HOURS AS NEEDED FOR PAIN  
  
 valsartan 160 mg tablet Commonly known as:  DIOVAN  
160 mg.  
  
 valsartan-hydroCHLOROthiazide 160-12.5 mg per tablet Commonly known as:  DIOVAN-HCT  
TAKE 1 TABLET BY ORAL ROUTE ONCE DAILY * cyanocobalamin 1,000 mcg tablet  
1,000 mcg. * VITAMIN B-12 1,000 mcg tablet Generic drug:  cyanocobalamin * warfarin 5 mg tablet Commonly known as:  COUMADIN  
TAKE 1 TABLET (5 MG) BY ORAL ROUTE ONCE DAILY * warfarin 1 mg tablet Commonly known as:  COUMADIN Take 1-5 tablets by mouth daily pending the INR values * Notice: This list has 6 medication(s) that are the same as other medications prescribed for you. Read the directions carefully, and ask your doctor or other care provider to review them with you. We Performed the Following COMPLETE CBC & AUTO DIFF WBC [88155 CPT(R)] Follow-up Instructions Return in about 3 months (around 5/14/2018). To-Do List   
 02/14/2018 Lab:  CBC WITH 3 PART DIFF Introducing [facility]! Florence Lopez introduces [employer] patient portal. Now you can access parts of your medical record, email your doctor's office, and request medication refills online. 1. In your internet browser, go to [email or web address] 2. Click on the First Time User? Click Here link in the Sign In box. You will see the New Member Sign Up page. 3. Enter your [employer] Access Code exactly as it appears below. You will not need to use this code after youve completed the sign-up process. If you do not sign up before the expiration date, you must request a new code. · [employer] Access Code: [facility] Expires: 2/20/2018  9:02 AM 
 
4.  Enter the last four digits of your Social Security Number (xxxx) and Date of Birth (mm/dd/yyyy) as indicated and click Submit. You will be taken to the next sign-up page. 5. Create a [employer] ID. This will be your [employer] login ID and cannot be changed, so think of one that is secure and easy to remember. 6. Create a [employer] password. You can change your password at any time. 7. Enter your Password Reset Question and Answer. This can be used at a later time if you forget your password. 8. Enter your e-mail address. You will receive e-mail notification when new information is available in [address] 9. Click Sign Up. You can now view and download portions of your medical record. 10. Click the Download Summary menu link to download a portable copy of your medical information. If you have questions, please visit the Frequently Asked Questions section of the [employer] website. Remember, [employer] is NOT to be used for urgent needs. For medical emergencies, dial 911. Now available from your iPhone and Android! Please provide this summary of care documentation to your next provider. Your primary care clinician is listed as Wilver Borja. If you have any questions after today's visit, please call [phone].

## 2018-02-14 NOTE — PROGRESS NOTES
Hematology/Oncology  Progress Note    Name: Eva Valle  Date: 2018  : 1935     Primary Care Provider: Gilda Rowland MD      Ms. Pan Abraham is a [de-identified]y.o. year old female who was seen for management of her thrombophilia. Current therapy: Coumadin 5 mg daily. Subjective:     Ms. Pan Abraham is a 70-year-old woman who has an underlying thrombophilia disorder. She is on chronic anticoagulation therapy. Currently she is taking 3 mg alternating with 4 mg every other day. Her INR value today is 2.5. She has not experienced any shortness of breath, cough, bruising, or bleeding. She remained essentially wheelchair confined. She was denied home INR device per insurance. She comes to come to  the clinic Q 2 weeks to have her INR checked. The patient has chronic arthritic discomfort in her lower extremities as well. The past medical, surgical and social history has been reviewed and remains unchanged. Past Medical History:   Diagnosis Date    Diverticulosis     Hypercholesterolemia     Hypertension     Polio     Thromboembolus ([facility] [address]     Left leg, indefinite anticoag Dr. Teresa Whitaker NR goal 1.5-2     No past surgical history on file. Social History     Social History    Marital status:      Spouse name: N/A    Number of children: N/A    Years of education: N/A     Occupational History    n/a      Social History Main Topics    Smoking status: Never Smoker    Smokeless tobacco: Never Used    Alcohol use No    Drug use: Not on file    Sexual activity: Not Currently     Partners: Male     Other Topics Concern    Not on file     Social History Narrative     Family History   Problem Relation Age of Onset    Asthma Father      Current Outpatient Prescriptions   Medication Sig Dispense Refill    VITAMIN B-12 1,000 mcg tablet   0    furosemide (LASIX) 40 mg tablet 40 mg.      folic acid-vit E0-ZKP D05 (FOLTX) 2.5-25-2 mg tablet Take 1 Tab by Mouth Once a Day.       ferrous sulfate 325 mg (65 mg iron) tablet 325 mg.  pantoprazole (PROTONIX) 20 mg tablet 20 mg.      valsartan (DIOVAN) 160 mg tablet 160 mg.      acetaminophen (TYLENOL) 500 mg tablet 500 mg.  cyanocobalamin 1,000 mcg tablet 1,000 mcg.  ferrous sulfate (IRON) 325 mg (65 mg iron) EC tablet   0    metoprolol tartrate (LOPRESSOR) 50 mg tablet   0    atenolol (TENORMIN) 50 mg tablet TAKE ONE TABLET BY MOUTH ONE TIME DAILY  90 Tab 0    KLOR-CON M20 20 mEq tablet TAKE ONE TABLET BY MOUTH TWICE DAILY  60 Tab 0    ergocalciferol (ERGOCALCIFEROL) 50,000 unit capsule Take 1 Cap by mouth every seven (7) days. Indications: Vitamin D Deficiency 12 Cap 3    warfarin (COUMADIN) 1 mg tablet Take 1-5 tablets by mouth daily pending the INR values 150 Tab 11    traMADol (ULTRAM) 50 mg tablet TAKE ONE TABLET BY MOUTH EVERY 6 HOURS AS NEEDED FOR PAIN 60 Tab 0    pravastatin (PRAVACHOL) 40 mg tablet TAKE 1  TABLET BY MOUTH EVERY EVENING 90 Tab 3    valsartan-hydroCHLOROthiazide (DIOVAN-HCT) 160-12.5 mg per tablet TAKE 1 TABLET BY ORAL ROUTE ONCE DAILY 30 Tab 10    magnesium oxide (MAG-OX) 400 mg tablet Take 1 Tab by mouth daily. 90 Tab 3    warfarin (COUMADIN) 5 mg tablet TAKE 1 TABLET (5 MG) BY ORAL ROUTE ONCE DAILY 30 Tab 11    albuterol (PROVENTIL HFA, VENTOLIN HFA, PROAIR HFA) 90 mcg/actuation inhaler Take 2 Puffs by inhalation every four (4) hours as needed for Wheezing. 1 Inhaler 0    fluticasone (FLONASE) 50 mcg/actuation nasal spray 2 sprays each nostril once daily 1 Bottle 0    cetirizine (ZYRTEC) 10 mg tablet Take 1 Tab by mouth daily. 30 Tab 11    miscellaneous medical supply misc Please extend/provide mobile wheelchair  Dx: polio contracture 1 Each 0       Review of Systems  Constitutional: The patient has no acute distress or discomfort.   HEENT: The patient denies recent head trauma, eye pain, blurred vision, hearing deficit, oropharyngeal mucosal pain or lesions, and the patient denies throat pain or discomfort. Lymphatics: The patient denies palpable peripheral lymphadenopathy. Hematologic: The patient denies having bruising, bleeding, or progressive fatigue. Respiratory: Patient denies having shortness of breath, cough, sputum production, fever, or dyspnea on exertion. Cardiovascular: The patient denies having leg pain, leg swelling, heart palpitations, chest pain, or lightheadedness. The patient denies having dyspnea on exertion. Gastrointestinal: The patient denies having nausea, emesis, or diarrhea. The patient denies having any hematemesis or blood in the stool. Genitourinary: Patient denies having urinary urgency, frequency, or dysuria. The patient denies having blood in the urine. Psychological: The patient denies having symptoms of nervousness, anxiety, depression, or thoughts of harming himself some of this. Skin: Patient denies having skin rashes, skin, ulcerations, or unexplained itching or pruritus. Musculoskeletal: Occasional arthritic pain at multiple joints, not new. Wheelchair. Limited ROM at RLE 2' polio hx. Objective:     Visit Vitals    /81    Pulse 62    Temp 98.6 °F (37 °C) (Oral)     Pain Score: 0/10    Physical Exam:   Gen. Appearance: The patient is in no acute distress. Skin: There is no bruise or rash. HEENT: The exam is unremarkable. Neck: Supple without lymphadenopathy or thyromegaly. Lungs: Clear to auscultation and percussion; there are no wheezes or rhonchi. Heart: Regular rate and rhythm; there are no murmurs, gallops, or rubs. Anterior chest wall and breasts: Deferred. Abdomen: Bowel sounds are present and normal. There is no guarding, tenderness, or hepatosplenomegaly. Extremities: There is no clubbing, cyanosis, or edema. Neurologic: There are no focal neurologic deficits. Lymphatics: There is no palpable peripheral lymphadenopathy. Musculoskeletal: LROM and non-weight bearing at RLE. Otherwise FROM at all other ext's.   Psychological/psychiatric: There is no clinical evidence of anxiety, depression, or melancholy. Laboratory Data:     Results for orders placed or performed during the hospital encounter of 02/14/18   CBC WITH 3 PART DIFF     Status: Abnormal   Result Value Ref Range Status    WBC 11.5 4.5 - 13.0 K/uL Final    RBC 3.42 (L) 4.10 - 5.10 M/uL Final    HGB 11.6 (L) 12.0 - 16 g/dL Final    HCT 37.0 36 - 48 % Final    .2 (H) 78 - 102 FL Final    MCH 33.9 25.0 - 35.0 PG Final    MCHC 31.4 31 - 37 g/dL Final    RDW 16.3 (H) 11.5 - 14.5 % Final    PLATELET 843 668 - 817 K/uL Final    NEUTROPHILS 85 (H) 40 - 70 % Final    MIXED CELLS 5 0.1 - 17 % Final    LYMPHOCYTES 11 (L) 14 - 44 % Final    ABS. NEUTROPHILS 9.8 (H) 1.8 - 9.5 K/UL Final    ABS. MIXED CELLS 0.5 0.0 - 2.3 K/uL Final    ABS. LYMPHOCYTES 1.2 1.1 - 5.9 K/UL Final     Comment: Test performed at [address] [address] Location. Results Reviewed by Medical Director. DF AUTOMATED   Final            Patient Active Problem List   Diagnosis Code    Polio A80.9    Hypertension I10    Morbid obesity ([facility] [address] E66.01    Hyperlipidemia with target LDL less than 130 E78.5    Thrombophilia ([facility] [address] D68.59    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    Ventral hernia K43.9    Allergic rhinitis due to allergen J30.9    Primary osteoarthritis of both knees M17.0    Advance directive discussed with patient Z70.80    Vitamin D deficiency E55.9    Hypomagnesemia E83.42    Neutrophilia D72.9    Advance care planning Z71.89         Assessment:     1. Thromboembolism of deep lower extremity veins, left ([address]    2. Thrombophilia ([facility] [address]    3. Neutrophilia    4. Vitamin D deficiency    5. Primary osteoarthritis of both knees          Plan: Thrombophilia/DVT left lower extremity: I have explained to the patient that the current value of her INR is 2.4. I have recommended that she continue her current dose of Coumadin at 5 mg p.o. daily. Will recheck INR again in 2 weeks.  The current CBC shows a stable WBC count of 5, her hemoglobin has declined to 11.6 g/dL with hematocrit of 37%. The platelet count is 705,090. I will have her return to clinic for a complete reassessment again in 12 weeks. She will return to our clinic for INR check in 2 weeks. Vitamin D deficiency: The patient has previously been prescribed vitamin D supplementation. however she has not been compliant in taking the supplement. I have encourage her to start taking her Vitamin D supplement. I have explained to the patient that she can continue to take over-the-counter vitamin D with calcium supplementation once she is done with this dose. At her next clinic visit in 12 weeks we will recheck her vitamin D levels. Primary osteoarthritis of both knees: The patient is currently taking tramadol 50 mg every 8 hours on a when necessary basis. I have advised the patient to also use Tylenol arthritis 2 tablets every 8 hours as needed. Since she is on long-term anticoagulation the patient was advised not to use over-the-counter NSAIDs such as aspirin, Motrin, or Aleve. These medications can increase the risk of bleeding again also impair renal function. Thrombophilia (persisting problem) I have explained to the patient and her neutrophil count is elevated to 77%. However the absolute neutrophil count remains normal at 9.8 with an absolute lymphocyte count of 1.2. We will monitor this at 12 week intervals. If there is persistent elevation in 12 weeks we will order flow cytometry for additional assessments. The patient will return to clinic for a followup visit in 4 months. Follow-up Disposition:  Return in about 3 months (around 5/14/2018).    Orders Placed This Encounter    COMPLETE CBC & AUTO DIFF WBC    InHouse CBC ([employer])     Standing Status:   Future     Number of Occurrences:   1     Standing Expiration Date:   0/66/1173    METABOLIC PANEL, COMPREHENSIVE     Standing Status:   Future     Standing Expiration Date:   2/15/2019    VITAMIN D, 25 HYDROXY     Standing Status:   Future     Standing Expiration Date:   2/15/2019       Parag Villanueva MD  2/14/2018

## 2018-02-21 ENCOUNTER — TELEPHONE (OUTPATIENT)
Dept: ONCOLOGY | Age: 83
End: 2018-02-21

## 2018-02-21 ENCOUNTER — TELEPHONE ANTICOAG (OUTPATIENT)
Dept: ONCOLOGY | Age: 83
End: 2018-02-21

## 2018-02-21 LAB — INR, EXTERNAL: 1.32

## 2018-02-21 NOTE — TELEPHONE ENCOUNTER
PT/INR CALLED IN by Kimberley Page @ [facility], [phone]    INR is 1.32  PT is 13.5    Pt has been taking 3 mg daily

## 2018-02-26 ENCOUNTER — TELEPHONE ANTICOAG (OUTPATIENT)
Dept: ONCOLOGY | Age: 83
End: 2018-02-26

## 2018-02-26 ENCOUNTER — TELEPHONE (OUTPATIENT)
Dept: ONCOLOGY | Age: 83
End: 2018-02-26

## 2018-02-26 DIAGNOSIS — D68.59 THROMBOPHILIA (HCC): ICD-10-CM

## 2018-03-05 ENCOUNTER — TELEPHONE (OUTPATIENT)
Dept: ONCOLOGY | Age: 83
End: 2018-03-05

## 2018-03-05 NOTE — TELEPHONE ENCOUNTER
INR 3.3, patient taking 4mg. Advised to hold dose tonight, then begin taking 3mg everyday. Patient needs repeat INR in 1 week. [facility] agency is discharging patient today.  They will call office to make appt for INR check

## 2018-03-21 ENCOUNTER — ANTI-COAG VISIT (OUTPATIENT)
Dept: ONCOLOGY | Age: 83
End: 2018-03-21

## 2018-03-21 VITALS — SYSTOLIC BLOOD PRESSURE: 123 MMHG | TEMPERATURE: 97.6 F | DIASTOLIC BLOOD PRESSURE: 73 MMHG | HEART RATE: 82 BPM

## 2018-03-21 DIAGNOSIS — D68.59 THROMBOPHILIA (HCC): Primary | ICD-10-CM

## 2018-03-21 LAB
INR BLD: 2.3 (ref 2–3)
VALID INTERNAL CONTROL?: YES

## 2018-04-04 ENCOUNTER — ANTI-COAG VISIT (OUTPATIENT)
Dept: ONCOLOGY | Age: 83
End: 2018-04-04

## 2018-04-04 VITALS — SYSTOLIC BLOOD PRESSURE: 134 MMHG | TEMPERATURE: 98.5 F | HEART RATE: 57 BPM | DIASTOLIC BLOOD PRESSURE: 66 MMHG

## 2018-04-04 DIAGNOSIS — D68.59 THROMBOPHILIA (HCC): ICD-10-CM

## 2018-04-04 DIAGNOSIS — I82.409 THROMBOEMBOLISM OF DEEP VEINS OF LOWER EXTREMITY, UNSPECIFIED LATERALITY (HCC): Primary | ICD-10-CM

## 2018-04-04 LAB
INR BLD: 1.6 (ref 2–3)
VALID INTERNAL CONTROL?: YES

## 2018-04-11 ENCOUNTER — ANTI-COAG VISIT (OUTPATIENT)
Dept: ONCOLOGY | Age: 83
End: 2018-04-11

## 2018-04-11 DIAGNOSIS — I82.409 THROMBOEMBOLISM OF DEEP VEINS OF LOWER EXTREMITY, UNSPECIFIED LATERALITY (HCC): Primary | ICD-10-CM

## 2018-04-11 DIAGNOSIS — D68.59 THROMBOPHILIA (HCC): ICD-10-CM

## 2018-04-11 LAB
INR BLD: 1.9 (ref 2–3)
VALID INTERNAL CONTROL?: YES

## 2018-04-25 ENCOUNTER — ANTI-COAG VISIT (OUTPATIENT)
Dept: ONCOLOGY | Age: 83
End: 2018-04-25

## 2018-04-25 VITALS — HEART RATE: 96 BPM | DIASTOLIC BLOOD PRESSURE: 80 MMHG | SYSTOLIC BLOOD PRESSURE: 136 MMHG | TEMPERATURE: 98.2 F

## 2018-04-25 DIAGNOSIS — I82.409 THROMBOEMBOLISM OF DEEP VEINS OF LOWER EXTREMITY, UNSPECIFIED LATERALITY (HCC): Primary | ICD-10-CM

## 2018-04-25 LAB
INR BLD: 2.3 (ref 2–3)
VALID INTERNAL CONTROL?: YES

## 2018-04-26 ENCOUNTER — TELEPHONE (OUTPATIENT)
Dept: ONCOLOGY | Age: 83
End: 2018-04-26

## 2018-04-26 ENCOUNTER — DOCUMENTATION ONLY (OUTPATIENT)
Dept: ONCOLOGY | Age: 83
End: 2018-04-26

## 2018-04-26 NOTE — TELEPHONE ENCOUNTER
Ms. Tapia Arm called back, patient has appointment 05/10/2018 @ 10:30 am.wt  Dr. Gladis Graham [phone] wants in writing dosage of Lindaoriana Delgado if she has to have extraction.

## 2018-05-09 ENCOUNTER — ANTI-COAG VISIT (OUTPATIENT)
Dept: ONCOLOGY | Age: 83
End: 2018-05-09

## 2018-05-09 DIAGNOSIS — I82.409 THROMBOEMBOLISM OF DEEP VEINS OF LOWER EXTREMITY, UNSPECIFIED LATERALITY (HCC): Primary | ICD-10-CM

## 2018-05-09 DIAGNOSIS — D68.59 THROMBOPHILIA (HCC): ICD-10-CM

## 2018-05-09 LAB
INR BLD: 2.9 (ref 2–3)
VALID INTERNAL CONTROL?: YES

## 2018-05-11 ENCOUNTER — TELEPHONE (OUTPATIENT)
Dept: ONCOLOGY | Age: 83
End: 2018-05-11

## 2018-05-11 NOTE — TELEPHONE ENCOUNTER
Inform patient that she is suppose to be alternating between 3 mg and 4 mg of coumadin. Her INR was 2.9. Pt verbalize understanding of plan.

## 2018-05-11 NOTE — TELEPHONE ENCOUNTER
Pt states she received a call back Jaron Adkins on what to take for her Coumadin. She forgot and took 4 mg on [employer]. Pt is confused and want you to give her a call on the correct dosage and what she may need to do know if the dosage was wrong. Please call.

## 2018-05-23 ENCOUNTER — ANTI-COAG VISIT (OUTPATIENT)
Dept: ONCOLOGY | Age: 83
End: 2018-05-23

## 2018-05-23 VITALS — TEMPERATURE: 98.3 F | DIASTOLIC BLOOD PRESSURE: 78 MMHG | SYSTOLIC BLOOD PRESSURE: 130 MMHG | HEART RATE: 69 BPM

## 2018-05-23 DIAGNOSIS — I82.409 THROMBOEMBOLISM OF DEEP VEINS OF LOWER EXTREMITY, UNSPECIFIED LATERALITY (HCC): Primary | ICD-10-CM

## 2018-05-23 LAB
INR BLD: 2.3 (ref 2–3)
VALID INTERNAL CONTROL?: YES

## 2018-06-06 ENCOUNTER — ANTI-COAG VISIT (OUTPATIENT)
Dept: ONCOLOGY | Age: 83
End: 2018-06-06

## 2018-06-06 VITALS — SYSTOLIC BLOOD PRESSURE: 128 MMHG | TEMPERATURE: 97.9 F | DIASTOLIC BLOOD PRESSURE: 82 MMHG | HEART RATE: 68 BPM

## 2018-06-06 DIAGNOSIS — D68.59 THROMBOPHILIA (HCC): Primary | ICD-10-CM

## 2018-06-06 LAB
INR BLD: 3 (ref 2–3)
VALID INTERNAL CONTROL?: YES

## 2018-06-20 ENCOUNTER — ANTI-COAG VISIT (OUTPATIENT)
Dept: ONCOLOGY | Age: 83
End: 2018-06-20

## 2018-06-20 DIAGNOSIS — D68.59 THROMBOPHILIA (HCC): ICD-10-CM

## 2018-06-20 LAB
INR BLD: 2.4 (ref 2–3)
VALID INTERNAL CONTROL?: YES

## 2018-07-18 ENCOUNTER — ANTI-COAG VISIT (OUTPATIENT)
Dept: ONCOLOGY | Age: 83
End: 2018-07-18

## 2018-07-18 DIAGNOSIS — D68.59 THROMBOPHILIA (HCC): ICD-10-CM

## 2018-07-18 DIAGNOSIS — I82.409 THROMBOEMBOLISM OF DEEP VEINS OF LOWER EXTREMITY, UNSPECIFIED LATERALITY (HCC): Primary | ICD-10-CM

## 2018-07-18 LAB
INR BLD: 2 (ref 2–3)
VALID INTERNAL CONTROL?: YES

## 2018-08-01 ENCOUNTER — HOSPITAL ENCOUNTER (OUTPATIENT)
Dept: LAB | Age: 83
Discharge: HOME | End: 2018-08-01
Payer: MEDICARE

## 2018-08-01 ENCOUNTER — ANTI-COAG VISIT (OUTPATIENT)
Dept: ONCOLOGY | Age: 83
End: 2018-08-01

## 2018-08-01 ENCOUNTER — OFFICE VISIT (OUTPATIENT)
Dept: ONCOLOGY | Age: 83
End: 2018-08-01

## 2018-08-01 ENCOUNTER — HOSPITAL ENCOUNTER (OUTPATIENT)
Dept: ONCOLOGY | Age: 83
Discharge: HOME | End: 2018-08-01

## 2018-08-01 VITALS — SYSTOLIC BLOOD PRESSURE: 145 MMHG | DIASTOLIC BLOOD PRESSURE: 73 MMHG | TEMPERATURE: 97.6 F

## 2018-08-01 DIAGNOSIS — I82.402 DEEP VEIN THROMBOSIS (DVT) OF LEFT LOWER EXTREMITY, UNSPECIFIED CHRONICITY, UNSPECIFIED VEIN (HCC): Primary | ICD-10-CM

## 2018-08-01 DIAGNOSIS — D50.8 IRON DEFICIENCY ANEMIA SECONDARY TO INADEQUATE DIETARY IRON INTAKE: ICD-10-CM

## 2018-08-01 DIAGNOSIS — I82.409 THROMBOEMBOLISM OF DEEP VEINS OF LOWER EXTREMITY, UNSPECIFIED LATERALITY (HCC): Primary | ICD-10-CM

## 2018-08-01 DIAGNOSIS — E55.9 VITAMIN D DEFICIENCY: ICD-10-CM

## 2018-08-01 DIAGNOSIS — D72.9 NEUTROPHILIA: ICD-10-CM

## 2018-08-01 DIAGNOSIS — I82.402 DEEP VEIN THROMBOSIS (DVT) OF LEFT LOWER EXTREMITY, UNSPECIFIED CHRONICITY, UNSPECIFIED VEIN (HCC): ICD-10-CM

## 2018-08-01 LAB
ALBUMIN SERPL-MCNC: 2.7 G/DL (ref 3.4–5)
ALBUMIN/GLOB SERPL: 0.7 {RATIO} (ref 0.8–1.7)
ALP SERPL-CCNC: 123 U/L (ref 45–117)
ALT SERPL-CCNC: 9 U/L (ref 13–56)
ANION GAP SERPL CALC-SCNC: 8 MMOL/L (ref 3–18)
AST SERPL-CCNC: 14 U/L (ref 15–37)
BASO+EOS+MONOS # BLD AUTO: 0.7 K/UL (ref 0–2.3)
BASO+EOS+MONOS # BLD AUTO: 7 % (ref 0.1–17)
BILIRUB SERPL-MCNC: 0.4 MG/DL (ref 0.2–1)
BUN SERPL-MCNC: 19 MG/DL (ref 7–18)
BUN/CREAT SERPL: 35 (ref 12–20)
CALCIUM SERPL-MCNC: 7.9 MG/DL (ref 8.5–10.1)
CHLORIDE SERPL-SCNC: 102 MMOL/L (ref 100–108)
CO2 SERPL-SCNC: 31 MMOL/L (ref 21–32)
CREAT SERPL-MCNC: 0.54 MG/DL (ref 0.6–1.3)
DIFFERENTIAL METHOD BLD: ABNORMAL
ERYTHROCYTE [DISTWIDTH] IN BLOOD BY AUTOMATED COUNT: 13.5 % (ref 11.5–14.5)
FERRITIN SERPL-MCNC: 84 NG/ML (ref 8–388)
GLOBULIN SER CALC-MCNC: 3.8 G/DL (ref 2–4)
GLUCOSE SERPL-MCNC: 69 MG/DL (ref 74–99)
HCT VFR BLD AUTO: 36.5 % (ref 36–48)
HGB BLD-MCNC: 11.3 G/DL (ref 12–16)
INR BLD: 2.1 (ref 2–3)
IRON SATN MFR SERPL: 18 %
IRON SERPL-MCNC: 41 UG/DL (ref 50–175)
LYMPHOCYTES # BLD: 1.3 K/UL (ref 1.1–5.9)
LYMPHOCYTES NFR BLD: 14 % (ref 14–44)
MCH RBC QN AUTO: 28.5 PG (ref 25–35)
MCHC RBC AUTO-ENTMCNC: 31 G/DL (ref 31–37)
MCV RBC AUTO: 92.2 FL (ref 78–102)
NEUTS SEG # BLD: 7.3 K/UL (ref 1.8–9.5)
NEUTS SEG NFR BLD: 79 % (ref 40–70)
PLATELET # BLD AUTO: 254 K/UL (ref 140–440)
POTASSIUM SERPL-SCNC: 3.6 MMOL/L (ref 3.5–5.5)
PROT SERPL-MCNC: 6.5 G/DL (ref 6.4–8.2)
RBC # BLD AUTO: 3.96 M/UL (ref 4.1–5.1)
SODIUM SERPL-SCNC: 141 MMOL/L (ref 136–145)
TIBC SERPL-MCNC: 229 UG/DL (ref 250–450)
VALID INTERNAL CONTROL?: YES
WBC # BLD AUTO: 9.3 K/UL (ref 4.5–13)

## 2018-08-01 PROCEDURE — 80053 COMPREHEN METABOLIC PANEL: CPT | Performed by: INTERNAL MEDICINE

## 2018-08-01 PROCEDURE — 83540 ASSAY OF IRON: CPT | Performed by: INTERNAL MEDICINE

## 2018-08-01 PROCEDURE — 82728 ASSAY OF FERRITIN: CPT | Performed by: INTERNAL MEDICINE

## 2018-08-01 PROCEDURE — 82306 VITAMIN D 25 HYDROXY: CPT | Performed by: INTERNAL MEDICINE

## 2018-08-01 PROCEDURE — 36415 COLL VENOUS BLD VENIPUNCTURE: CPT | Performed by: INTERNAL MEDICINE

## 2018-08-01 NOTE — PROGRESS NOTES
Hematology/Oncology  Progress Note    Name: Alyssa Basurto  Date: 2018  : 1935     Primary Care Provider: John Damon MD      Ms. Noel Aldana is a 82y.o. year old female who was seen for management of her thrombophilia. Current therapy: Coumadin 5 mg daily. Subjective:     Ms. Noel Aldana is a 51-year-old woman who has an underlying thrombophilia disorder. She is on chronic anticoagulation therapy. Currently she is taking 3 mg alternating with 4 mg every other day. Her INR value today is 2.1. She has not experienced any shortness of breath, cough, bruising, or bleeding. She remained essentially wheelchair confined. She was denied home INR device per insurance. She comes to the clinic every 2 weeks to have her INR checked. The patient has chronic arthritic discomfort in her lower extremities as well. The past medical, surgical and social history has been reviewed and remains unchanged. Past Medical History:   Diagnosis Date    Diverticulosis     Hypercholesterolemia     Hypertension     Polio     Thromboembolus ([facility] [address]     Left leg, indefinite anticoag Dr. Maria Tsai NR goal 1.5-2     No past surgical history on file. Social History     Social History    Marital status:      Spouse name: N/A    Number of children: N/A    Years of education: N/A     Occupational History    n/a      Social History Main Topics    Smoking status: Never Smoker    Smokeless tobacco: Never Used    Alcohol use No    Drug use: Not on file    Sexual activity: Not Currently     Partners: Male     Other Topics Concern    Not on file     Social History Narrative     Family History   Problem Relation Age of Onset    Asthma Father      Current Outpatient Prescriptions   Medication Sig Dispense Refill    VITAMIN B-12 1,000 mcg tablet   0    furosemide (LASIX) 40 mg tablet 40 mg.      folic acid-vit S5-JBM S06 (FOLTX) 2.5-25-2 mg tablet Take 1 Tab by Mouth Once a Day.       ferrous sulfate 325 mg (65 mg iron) tablet 325 mg.  pantoprazole (PROTONIX) 20 mg tablet 20 mg.      valsartan (DIOVAN) 160 mg tablet 160 mg.      acetaminophen (TYLENOL) 500 mg tablet 500 mg.  cyanocobalamin 1,000 mcg tablet 1,000 mcg.  ferrous sulfate (IRON) 325 mg (65 mg iron) EC tablet   0    metoprolol tartrate (LOPRESSOR) 50 mg tablet   0    atenolol (TENORMIN) 50 mg tablet TAKE ONE TABLET BY MOUTH ONE TIME DAILY  90 Tab 0    KLOR-CON M20 20 mEq tablet TAKE ONE TABLET BY MOUTH TWICE DAILY  60 Tab 0    ergocalciferol (ERGOCALCIFEROL) 50,000 unit capsule Take 1 Cap by mouth every seven (7) days. Indications: Vitamin D Deficiency 12 Cap 3    warfarin (COUMADIN) 1 mg tablet Take 1-5 tablets by mouth daily pending the INR values 150 Tab 11    traMADol (ULTRAM) 50 mg tablet TAKE ONE TABLET BY MOUTH EVERY 6 HOURS AS NEEDED FOR PAIN 60 Tab 0    pravastatin (PRAVACHOL) 40 mg tablet TAKE 1  TABLET BY MOUTH EVERY EVENING 90 Tab 3    valsartan-hydroCHLOROthiazide (DIOVAN-HCT) 160-12.5 mg per tablet TAKE 1 TABLET BY ORAL ROUTE ONCE DAILY 30 Tab 10    magnesium oxide (MAG-OX) 400 mg tablet Take 1 Tab by mouth daily. 90 Tab 3    warfarin (COUMADIN) 5 mg tablet TAKE 1 TABLET (5 MG) BY ORAL ROUTE ONCE DAILY 30 Tab 11    albuterol (PROVENTIL HFA, VENTOLIN HFA, PROAIR HFA) 90 mcg/actuation inhaler Take 2 Puffs by inhalation every four (4) hours as needed for Wheezing. 1 Inhaler 0    fluticasone (FLONASE) 50 mcg/actuation nasal spray 2 sprays each nostril once daily 1 Bottle 0    cetirizine (ZYRTEC) 10 mg tablet Take 1 Tab by mouth daily. 30 Tab 11    miscellaneous medical supply misc Please extend/provide mobile wheelchair  Dx: polio contracture 1 Each 0       Review of Systems  Constitutional: The patient has no acute distress or discomfort.   HEENT: The patient denies recent head trauma, eye pain, blurred vision, hearing deficit, oropharyngeal mucosal pain or lesions, and the patient denies throat pain or discomfort. Lymphatics: The patient denies palpable peripheral lymphadenopathy. Hematologic: The patient denies having bruising, bleeding, or progressive fatigue. Respiratory: Patient denies having shortness of breath, cough, sputum production, fever, or dyspnea on exertion. Cardiovascular: The patient denies having leg pain, leg swelling, heart palpitations, chest pain, or lightheadedness. The patient denies having dyspnea on exertion. Gastrointestinal: The patient denies having nausea, emesis, or diarrhea. The patient denies having any hematemesis or blood in the stool. Genitourinary: Patient denies having urinary urgency, frequency, or dysuria. The patient denies having blood in the urine. Psychological: The patient denies having symptoms of nervousness, anxiety, depression, or thoughts of harming himself some of this. Skin: Patient denies having skin rashes, skin, ulcerations, or unexplained itching or pruritus. Musculoskeletal: Occasional arthritic pain at multiple joints, not new. Wheelchair. Limited ROM at E 2' polio hx. Objective:     Visit Vitals    /73 (BP 1 Location: Left arm, BP Patient Position: Sitting)    Temp 97.6 °F (36.4 °C) (Oral)     Pain Score: 0/10    Physical Exam:   Gen. Appearance: The patient is in no acute distress. Skin: There is no bruise or rash. HEENT: The exam is unremarkable. Neck: Supple without lymphadenopathy or thyromegaly. Lungs: Clear to auscultation and percussion; there are no wheezes or rhonchi. Heart: Regular rate and rhythm; there are no murmurs, gallops, or rubs. Anterior chest wall and breasts: Deferred. Abdomen: Bowel sounds are present and normal. There is no guarding, tenderness, or hepatosplenomegaly. Extremities: There is no clubbing, cyanosis, or edema. Neurologic: There are no focal neurologic deficits. Lymphatics: There is no palpable peripheral lymphadenopathy. Musculoskeletal: LROM and non-weight bearing at RLE.  Otherwise FROM at all other ext's. Psychological/psychiatric: There is no clinical evidence of anxiety, depression, or melancholy. Laboratory Data:     Results for orders placed or performed during the hospital encounter of 08/01/18   CBC WITH 3 PART DIFF     Status: Abnormal   Result Value Ref Range Status    WBC 9.3 4.5 - 13.0 K/uL Final    RBC 3.96 (L) 4.10 - 5.10 M/uL Final    HGB 11.3 (L) 12.0 - 16 g/dL Final    HCT 36.5 36 - 48 % Final    MCV 92.2 78 - 102 FL Final    MCH 28.5 25.0 - 35.0 PG Final    MCHC 31.0 31 - 37 g/dL Final    RDW 13.5 11.5 - 14.5 % Final    PLATELET 764 539 - 050 K/uL Final    NEUTROPHILS 79 (H) 40 - 70 % Final    MIXED CELLS 7 0.1 - 17 % Final    LYMPHOCYTES 14 14 - 44 % Final    ABS. NEUTROPHILS 7.3 1.8 - 9.5 K/UL Final    ABS. MIXED CELLS 0.7 0.0 - 2.3 K/uL Final    ABS. LYMPHOCYTES 1.3 1.1 - 5.9 K/UL Final     Comment: Test performed at [place] [id number] Location. Results Reviewed by Medical Director. 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 Deep vein thrombosis (DVT) of left lower extremity, unspecified chronicity, unspecified vein (HCC)    2. Iron deficiency anemia secondary to inadequate dietary iron intake    3. Vitamin D deficiency    4. Neutrophilia          Plan:    Thrombophilia/DVT left lower extremity: I have explained to the patient that the current value of her INR is 2.1. I have recommended that she continue her current dose of Coumadin at 3 mg p.o. daily. Will recheck INR again in 2 weeks. The current CBC shows a stable WBC count of 9.3, the hemoglobin is currently 11.3 g/dL with hematocrit of 36.5%. .  The platelet count is 370,375. I will have her return to clinic for a complete reassessment again in 12 weeks. She will return to our clinic for INR check in 2 weeks. Vitamin D deficiency: The patient has previously been prescribed vitamin D supplementation. however she has not been compliant in taking the supplement. I have encourage her to start taking her Vitamin D supplement. I have explained to the patient that she can continue to take over-the-counter vitamin D with calcium supplementation once she is done with this dose. At her next clinic visit in 12 weeks we will recheck her vitamin D levels. Primary osteoarthritis of both knees: The patient is currently taking tramadol 50 mg every 8 hours on a when necessary basis. I have advised the patient to also use Tylenol arthritis 2 tablets every 8 hours as needed. Since she is on long-term anticoagulation the patient was advised not to use over-the-counter NSAIDs such as aspirin, Motrin, or Aleve. These medications can increase the risk of bleeding again also impair renal function. Thrombophilia (persisting problem) I have explained to the patient and her neutrophil count is elevated to 79%. However the absolute neutrophil count remains normal at 7.3 with an absolute lymphocyte count of 1.3. We will monitor this at 12 week intervals. If there is persistent elevation in 12 weeks we will order flow cytometry for additional assessments. The patient will return to clinic for a follow-up visit in 4 months.     Follow-up Disposition: Not on File   Orders Placed This Encounter    COMPLETE CBC & AUTO DIFF WBC    InHouse CBC ([employer])     Standing Status:   Future     Number of Occurrences:   1 Standing Expiration Date:   7/2/8044    METABOLIC PANEL, COMPREHENSIVE     Standing Status:   Future     Standing Expiration Date:   8/2/2019    IRON PROFILE     Standing Status:   Future     Standing Expiration Date:   8/2/2019    FERRITIN     Standing Status:   Future     Standing Expiration Date:   8/2/2019    VITAMIN D, 25 HYDROXY     Standing Status:   Future     Standing Expiration Date:   8/2/2019       Bonny Lim MD  8/1/2018

## 2018-08-02 DIAGNOSIS — E55.9 VITAMIN D DEFICIENCY: Primary | ICD-10-CM

## 2018-08-02 LAB — 25(OH)D3 SERPL-MCNC: 27.1 NG/ML (ref 30–100)

## 2018-08-02 RX ORDER — ERGOCALCIFEROL 1.25 MG/1
50000 CAPSULE ORAL
Qty: 12 CAP | Refills: 3 | Status: SHIPPED | OUTPATIENT
Start: 2018-08-02

## 2018-08-02 RX ORDER — CALCIUM CARBONATE 500(1250)
1 TABLET ORAL DAILY
Qty: 90 TAB | Refills: 1 | Status: SHIPPED | OUTPATIENT
Start: 2018-08-02

## 2018-08-07 RX ORDER — VALSARTAN 160 MG/1
TABLET ORAL
Qty: 90 TAB | Refills: 3 | Status: SHIPPED | OUTPATIENT
Start: 2018-08-07

## 2018-08-15 ENCOUNTER — ANTI-COAG VISIT (OUTPATIENT)
Dept: ONCOLOGY | Age: 83
End: 2018-08-15

## 2018-08-15 DIAGNOSIS — D68.59 THROMBOPHILIA (HCC): Primary | ICD-10-CM

## 2018-08-15 LAB
INR BLD: 1.7 (ref 2–3)
VALID INTERNAL CONTROL?: YES

## 2018-08-28 NOTE — PATIENT INSTRUCTIONS
Iron Deficiency Anemia: Care Instructions  Your Care Instructions    Anemia means that you do not have enough red blood cells. Red blood cells carry oxygen around your body. When you have anemia, it can make you pale, weak, and tired. Many things can cause anemia. The most common cause is loss of blood. This can happen if you have heavy menstrual periods. It can also happen if you have bleeding in your stomach or bowel. You can also get anemia if you don't have enough iron in your diet or if it's hard for your body to absorb iron. In some cases, pregnancy causes anemia. That's because a pregnant woman needs more iron. Your doctor may do more tests to find the cause of your anemia. If a disease or other health problem is causing it, your doctor will treat that problem. It's important to follow up with your doctor to make sure that your iron level returns to normal.  Follow-up care is a key part of your treatment and safety. Be sure to make and go to all appointments, and call your doctor if you are having problems. It's also a good idea to know your test results and keep a list of the medicines you take. How can you care for yourself at home? · If your doctor recommended iron pills, take them as directed. ¨ Try to take the pills on an empty stomach. You can do this about 1 hour before or 2 hours after meals. But you may need to take iron with food to avoid an upset stomach. ¨ Do not take antacids or drink milk or anything with caffeine within 2 hours of when you take your iron. They can keep your body from absorbing the iron well. ¨ Vitamin C helps your body absorb iron. You may want to take iron pills with a glass of orange juice or some other food high in vitamin C.  ¨ Iron pills may cause stomach problems. These include heartburn, nausea, diarrhea, constipation, and cramps. It can help to drink plenty of fluids and include fruits, vegetables, and fiber in your diet.   ¨ It's normal for iron pills to make your stool a greenish or grayish black. But internal bleeding can also cause dark stool. So it's important to tell your doctor about any color changes. ¨ Call your doctor if you think you are having a problem with your iron pills. Even after you start to feel better, it will take several months for your body to build up its supply of iron. ¨ If you miss a pill, don't take a double dose. ¨ Keep iron pills out of the reach of small children. Too much iron can be very dangerous. · Eat foods with a lot of iron. These include red meat, shellfish, poultry, and eggs. They also include beans, raisins, whole-grain bread, and leafy green vegetables. · Steam your vegetables. This is the best way to prepare them if you want to get as much iron as possible. · Be safe with medicines. Do not take nonsteroidal anti-inflammatory pain relievers unless your doctor tells you to. These include aspirin, naproxen (Aleve), and ibuprofen (Advil, Motrin). · Liquid iron can stain your teeth. But you can mix it with water or juice and drink it with a straw. Then it won't get on your teeth. When should you call for help? Call 911 anytime you think you may need emergency care. For example, call if:    · You passed out (lost consciousness).    Call your doctor now or seek immediate medical care if:    · You are short of breath.     · You are dizzy or light-headed, or you feel like you may faint.     · You have new or worse bleeding.    Watch closely for changes in your health, and be sure to contact your doctor if:    · You feel weaker or more tired than usual.     · You do not get better as expected. Where can you learn more? Go to http://lawrence-yennifer.info/. Enter K406 in the search box to learn more about \"Iron Deficiency Anemia: Care Instructions. \"  Current as of: October 9, 2017  Content Version: 11.7  © 7823-7187 [employer].  Care instructions adapted under license by [employer] (which disclaims liability or warranty for this information). If you have questions about a medical condition or this instruction, always ask your healthcare professional. [address] any warranty or liability for your use of this information.

## 2018-12-03 NOTE — PATIENT INSTRUCTIONS
Iron Deficiency Anemia: Care Instructions Your Care Instructions Anemia means that you do not have enough red blood cells. Red blood cells carry oxygen around your body. When you have anemia, it can make you pale, weak, and tired. Many things can cause anemia. The most common cause is loss of blood. This can happen if you have heavy menstrual periods. It can also happen if you have bleeding in your stomach or bowel. You can also get anemia if you don't have enough iron in your diet or if it's hard for your body to absorb iron. In some cases, pregnancy causes anemia. That's because a pregnant woman needs more iron. Your doctor may do more tests to find the cause of your anemia. If a disease or other health problem is causing it, your doctor will treat that problem. It's important to follow up with your doctor to make sure that your iron level returns to normal. 
Follow-up care is a key part of your treatment and safety. Be sure to make and go to all appointments, and call your doctor if you are having problems. It's also a good idea to know your test results and keep a list of the medicines you take. How can you care for yourself at home? · If your doctor recommended iron pills, take them as directed. ? Try to take the pills on an empty stomach. You can do this about 1 hour before or 2 hours after meals. But you may need to take iron with food to avoid an upset stomach. ? Do not take antacids or drink milk or anything with caffeine within 2 hours of when you take your iron. They can keep your body from absorbing the iron well. ? Vitamin C helps your body absorb iron. You may want to take iron pills with a glass of orange juice or some other food high in vitamin C. 
? Iron pills may cause stomach problems. These include heartburn, nausea, diarrhea, constipation, and cramps. It can help to drink plenty of fluids and include fruits, vegetables, and fiber in your diet. ? It's normal for iron pills to make your stool a greenish or grayish black. But internal bleeding can also cause dark stool. So it's important to tell your doctor about any color changes. ? Call your doctor if you think you are having a problem with your iron pills. Even after you start to feel better, it will take several months for your body to build up its supply of iron. ? If you miss a pill, don't take a double dose. ? Keep iron pills out of the reach of small children. Too much iron can be very dangerous. · Eat foods with a lot of iron. These include red meat, shellfish, poultry, and eggs. They also include beans, raisins, whole-grain bread, and leafy green vegetables. · Steam your vegetables. This is the best way to prepare them if you want to get as much iron as possible. · Be safe with medicines. Do not take nonsteroidal anti-inflammatory pain relievers unless your doctor tells you to. These include aspirin, naproxen (Aleve), and ibuprofen (Advil, Motrin). · Liquid iron can stain your teeth. But you can mix it with water or juice and drink it with a straw. Then it won't get on your teeth. When should you call for help? Call 911 anytime you think you may need emergency care. For example, call if: 
  · You passed out (lost consciousness).  
 Call your doctor now or seek immediate medical care if: 
  · You are short of breath.  
  · You are dizzy or light-headed, or you feel like you may faint.  
  · You have new or worse bleeding.  
 Watch closely for changes in your health, and be sure to contact your doctor if: 
  · You feel weaker or more tired than usual.  
  · You do not get better as expected. Where can you learn more? Go to http://lawrence-yennifer.info/. Enter Z372 in the search box to learn more about \"Iron Deficiency Anemia: Care Instructions. \" Current as of: May 7, 2018 Content Version: 11.8 © 2697-2945 [employer].  Care instructions adapted under license by [address] (which disclaims liability or warranty for this information). If you have questions about a medical condition or this instruction, always ask your healthcare professional. [address] any warranty or liability for your use of this information.

## 2018-12-03 NOTE — PROGRESS NOTES
Hematology/Oncology  Progress Note Name: Darek Borges Date: 12/3/2018 : 1935 Primary Care Provider: Arnel Saavedra MD 
 
 
Ms. Azalia Schrader is a 82y.o. year old female who was seen for management of her thrombophilia. Current therapy: Coumadin 5 mg daily. Subjective:  
 
Ms. Azalia Schrader is a 72-year-old woman who has an underlying thrombophilia disorder. She is on chronic anticoagulation therapy. Currently she is taking 3 mg alternating with 4 mg every other day. Her INR value today is 2.1. She has not experienced any shortness of breath, cough, bruising, or bleeding. She remained essentially wheelchair confined. She was denied home INR device per insurance. She comes to the clinic every 2 weeks to have her INR checked. The patient has chronic arthritic discomfort in her lower extremities as well. The past medical, surgical and social history has been reviewed and remains unchanged. Past Medical History:  
Diagnosis Date  Diverticulosis  Hypercholesterolemia  Hypertension  Polio  Thromboembolus ([facility] [address]  Left leg, indefinite anticoag Dr. Angie Mckenzie NR goal 1.5-2 History reviewed. No pertinent surgical history. Social History Socioeconomic History  Marital status:  Spouse name: Not on file  Number of children: Not on file  Years of education: Not on file  Highest education level: Not on file Social Needs  Financial resource strain: Not on file  Food insecurity - worry: Not on file  Food insecurity - inability: Not on file  Transportation needs - medical: Not on file  Transportation needs - non-medical: Not on file Occupational History  Occupation: n/a Tobacco Use  Smoking status: Never Smoker  Smokeless tobacco: Never Used Substance and Sexual Activity  Alcohol use: No  
 Drug use: Not on file  Sexual activity: Not Currently Partners: Male Other Topics Concern  Not on file Social History Narrative  Not on file Family History Problem Relation Age of Onset  Asthma Father Current Outpatient Medications Medication Sig Dispense Refill  warfarin (COUMADIN) 1 mg tablet TAKE 1 TO 5 TABLETS BY MOUTH DAILY PENDING THE INR VALUES 150 Tab 10  
 valsartan (DIOVAN) 160 mg tablet TAKE ONE TABLET BY MOUTH EVERY DAY FOR BLOOD PRESSURE 90 Tab 3  
 ergocalciferol (ERGOCALCIFEROL) 50,000 unit capsule Take 1 Cap by mouth every seven (7) days. Indications: Vitamin D Deficiency 12 Cap 3  
 calcium carbonate (CALCIUM 500) 500 mg calcium (1,250 mg) tablet Take 1 Tab by mouth daily. 90 Tab 1  VITAMIN B-12 1,000 mcg tablet   0  
 furosemide (LASIX) 40 mg tablet 40 mg.    
 folic acid-vit W0-DXA U60 (FOLTX) 2.5-25-2 mg tablet Take 1 Tab by Mouth Once a Day.  ferrous sulfate 325 mg (65 mg iron) tablet 325 mg.  pantoprazole (PROTONIX) 20 mg tablet 20 mg.    
 acetaminophen (TYLENOL) 500 mg tablet 500 mg.  cyanocobalamin 1,000 mcg tablet 1,000 mcg.  ferrous sulfate (IRON) 325 mg (65 mg iron) EC tablet   0  
 metoprolol tartrate (LOPRESSOR) 50 mg tablet   0  
 atenolol (TENORMIN) 50 mg tablet TAKE ONE TABLET BY MOUTH ONE TIME DAILY  90 Tab 0  
 KLOR-CON M20 20 mEq tablet TAKE ONE TABLET BY MOUTH TWICE DAILY  60 Tab 0  
 traMADol (ULTRAM) 50 mg tablet TAKE ONE TABLET BY MOUTH EVERY 6 HOURS AS NEEDED FOR PAIN 60 Tab 0  pravastatin (PRAVACHOL) 40 mg tablet TAKE 1  TABLET BY MOUTH EVERY EVENING 90 Tab 3  
 magnesium oxide (MAG-OX) 400 mg tablet Take 1 Tab by mouth daily. 90 Tab 3  warfarin (COUMADIN) 5 mg tablet TAKE 1 TABLET (5 MG) BY ORAL ROUTE ONCE DAILY 30 Tab 11  
 albuterol (PROVENTIL HFA, VENTOLIN HFA, PROAIR HFA) 90 mcg/actuation inhaler Take 2 Puffs by inhalation every four (4) hours as needed for Wheezing.  1 Inhaler 0  
 fluticasone (FLONASE) 50 mcg/actuation nasal spray 2 sprays each nostril once daily 1 Bottle 0  
  cetirizine (ZYRTEC) 10 mg tablet Take 1 Tab by mouth daily. 30 Tab 11  
 miscellaneous medical supply misc Please extend/provide mobile wheelchair  Dx: polio contracture 1 Each 0 Review of Systems Constitutional: The patient has no acute distress or discomfort. HEENT: The patient denies recent head trauma, eye pain, blurred vision, hearing deficit, oropharyngeal mucosal pain or lesions, and the patient denies throat pain or discomfort. Lymphatics: The patient denies palpable peripheral lymphadenopathy. Hematologic: The patient denies having bruising, bleeding, or progressive fatigue. Respiratory: Patient denies having shortness of breath, cough, sputum production, fever, or dyspnea on exertion. Cardiovascular: The patient denies having leg pain, leg swelling, heart palpitations, chest pain, or lightheadedness. The patient denies having dyspnea on exertion. Gastrointestinal: The patient denies having nausea, emesis, or diarrhea. The patient denies having any hematemesis or blood in the stool. Genitourinary: Patient denies having urinary urgency, frequency, or dysuria. The patient denies having blood in the urine. Psychological: The patient denies having symptoms of nervousness, anxiety, depression, or thoughts of harming himself some of this. Skin: Patient denies having skin rashes, skin, ulcerations, or unexplained itching or pruritus. Musculoskeletal: Occasional arthritic pain at multiple joints, not new. Wheelchair. Limited ROM at RLE 2' polio hx. Objective:  
 
Visit Vitals /70 Pulse (!) 58 Temp 98.2 °F (36.8 °C) (Oral) Resp 16 Ht 4' 11\" (1.499 m) SpO2 99% Pain Score: 0/10 Physical Exam:  
Gen. Appearance: The patient is in no acute distress. Skin: There is no bruise or rash. HEENT: The exam is unremarkable. Neck: Supple without lymphadenopathy or thyromegaly. Lungs: Clear to auscultation and percussion; there are no wheezes or rhonchi.  Heart: Regular rate and rhythm; there are no murmurs, gallops, or rubs. Anterior chest wall and breasts: Deferred. Abdomen: Bowel sounds are present and normal. There is no guarding, tenderness, or hepatosplenomegaly. Extremities: There is no clubbing, cyanosis, or edema. Neurologic: There are no focal neurologic deficits. Lymphatics: There is no palpable peripheral lymphadenopathy. Musculoskeletal: LROM and non-weight bearing at RLE. Otherwise FROM at all other ext's. Psychological/psychiatric: There is no clinical evidence of anxiety, depression, or melancholy. Laboratory Data:    
Results for orders placed or performed during the hospital encounter of 12/03/18 CBC WITH 3 PART DIFF     Status: Abnormal  
Result Value Ref Range Status WBC 8.9 4.5 - 13.0 K/uL Final  
 RBC 3.97 (L) 4.10 - 5.10 M/uL Final  
 HGB 11.2 (L) 12.0 - 16 g/dL Final  
 HCT 36.1 36 - 48 % Final  
 MCV 90.9 78 - 102 FL Final  
 MCH 28.2 25.0 - 35.0 PG Final  
 MCHC 31.0 31 - 37 g/dL Final  
 RDW 12.7 11.5 - 14.5 % Final  
 PLATELET 178 196 - 248 K/uL Final  
 NEUTROPHILS 82 (H) 40 - 70 % Final  
 MIXED CELLS 8 0.1 - 17 % Final  
 LYMPHOCYTES 10 (L) 14 - 44 % Final  
 ABS. NEUTROPHILS 7.3 1.8 - 9.5 K/UL Final  
 ABS. MIXED CELLS 0.7 0.0 - 2.3 K/uL Final  
 ABS. LYMPHOCYTES 0.9 (L) 1.1 - 5.9 K/UL Final  
  Comment: Test performed at [place] [id number] Location. Results Reviewed by Medical Director. DF AUTOMATED   Final  
 
 
 
  
Patient Active Problem List  
Diagnosis Code  Polio A80.9  Hypertension I10  Morbid obesity (HCC) E66.01  
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  
 Iron deficiency anemia secondary to inadequate dietary iron intake D50.8 Assessment: 1. Deep vein thrombosis (DVT) of left lower extremity, unspecified chronicity, unspecified vein (HCC) 2. Iron deficiency anemia secondary to inadequate dietary iron intake 3. Vitamin D deficiency 4. Primary osteoarthritis of both knees Plan: Thrombophilia/DVT left lower extremity: I have explained to the patient that the current value of her INR is 2.5. I have recommended that she continue her current dose of Coumadin at 4 mg Coumadin alternating every other day with 5 mg. Will recheck INR again in 2 weeks. The current CBC shows a stable WBC count of 8.9, the hemoglobin is 11.2 g/dL with hematocrit of 36.1%. The platelet count is currently 247,000. .  I will have her return to clinic for a complete reassessment again in 12 weeks. She will return to our clinic for INR check in 2 weeks. Vitamin D deficiency: The patient has previously been prescribed vitamin D supplementation. however she has not been compliant in taking the supplement. I have encourage her to start taking her Vitamin D supplement. I have explained to the patient that she can continue to take over-the-counter vitamin D with calcium supplementation once she is done with this dose. At her next clinic visit in 12 weeks we will recheck her vitamin D levels. Primary osteoarthritis of both knees: The patient is currently taking tramadol 50 mg every 8 hours on a when necessary basis. I have advised the patient to also use Tylenol arthritis 2 tablets every 8 hours as needed. Since she is on long-term anticoagulation the patient was advised not to use over-the-counter NSAIDs such as aspirin, Motrin, or Aleve. These medications can increase the risk of bleeding again also impair renal function.  
 
Thrombophilia (persisting problem) I have explained to the patient and her neutrophil count is elevated to 82 %. However the absolute neutrophil count remains normal at 7.3 with an absolute lymphocyte count of 0.9. We will monitor this at 12 week intervals. If there is persistent elevation in 12 weeks we will order flow cytometry for additional assessments. The patient will return to clinic for a followup visit in 4 months. Follow-up Disposition: 
Return in about 4 months (around 4/3/2019). Orders Placed This Encounter  COMPLETE CBC & AUTO DIFF WBC  InHouse CBC ([facility]) Standing Status:   Future Number of Occurrences:   1 Standing Expiration Date:   12/10/2018  METABOLIC PANEL, COMPREHENSIVE Standing Status:   Future Standing Expiration Date:   12/4/2019  
 IRON PROFILE Standing Status:   Future Standing Expiration Date:   12/4/2019  FERRITIN Standing Status:   Future Standing Expiration Date:   12/4/2019  VITAMIN D, 25 HYDROXY Standing Status:   Future Standing Expiration Date:   12/4/2019 Hudson Mendoza MD 
8/1/2018

## 2018-12-07 NOTE — TELEPHONE ENCOUNTER
Patients daughter called patient is out of coumadin. She stated [address] said they faxed over a request. I haven't received it at my fax machine. Doesn't know when it was faxed or the fax number sent to. Just knows it should have been to the [facility] office.

## 2019-01-01 ENCOUNTER — ANTI-COAG VISIT (OUTPATIENT)
Dept: ONCOLOGY | Age: 84
End: 2019-01-01

## 2019-01-01 ENCOUNTER — TELEPHONE ANTICOAG (OUTPATIENT)
Dept: INFUSION THERAPY | Age: 84
End: 2019-01-01

## 2019-01-01 ENCOUNTER — HOSPITAL ENCOUNTER (OUTPATIENT)
Dept: LAB | Age: 84
Discharge: HOME | End: 2019-04-29
Payer: MEDICARE

## 2019-01-01 ENCOUNTER — TELEPHONE (OUTPATIENT)
Dept: ONCOLOGY | Age: 84
End: 2019-01-01

## 2019-01-01 ENCOUNTER — OFFICE VISIT (OUTPATIENT)
Dept: ONCOLOGY | Age: 84
End: 2019-01-01

## 2019-01-01 ENCOUNTER — HOSPITAL ENCOUNTER (OUTPATIENT)
Dept: ONCOLOGY | Age: 84
Discharge: HOME | End: 2019-04-29

## 2019-01-01 VITALS
HEIGHT: 59 IN | OXYGEN SATURATION: 94 % | TEMPERATURE: 97.6 F | RESPIRATION RATE: 18 BRPM | SYSTOLIC BLOOD PRESSURE: 139 MMHG | HEART RATE: 62 BPM | DIASTOLIC BLOOD PRESSURE: 72 MMHG

## 2019-01-01 DIAGNOSIS — E55.9 VITAMIN D DEFICIENCY: ICD-10-CM

## 2019-01-01 DIAGNOSIS — I82.402 DEEP VEIN THROMBOSIS (DVT) OF LEFT LOWER EXTREMITY, UNSPECIFIED CHRONICITY, UNSPECIFIED VEIN (HCC): Primary | ICD-10-CM

## 2019-01-01 DIAGNOSIS — D72.9 NEUTROPHILIA: ICD-10-CM

## 2019-01-01 DIAGNOSIS — D50.8 IRON DEFICIENCY ANEMIA SECONDARY TO INADEQUATE DIETARY IRON INTAKE: Primary | ICD-10-CM

## 2019-01-01 DIAGNOSIS — D68.59 THROMBOPHILIA (HCC): ICD-10-CM

## 2019-01-01 DIAGNOSIS — I82.409 THROMBOEMBOLISM OF DEEP VEINS OF LOWER EXTREMITY, UNSPECIFIED LATERALITY (HCC): Primary | ICD-10-CM

## 2019-01-01 DIAGNOSIS — D50.8 IRON DEFICIENCY ANEMIA SECONDARY TO INADEQUATE DIETARY IRON INTAKE: ICD-10-CM

## 2019-01-01 DIAGNOSIS — I82.409 THROMBOEMBOLISM OF DEEP VEINS OF LOWER EXTREMITY, UNSPECIFIED LATERALITY (HCC): ICD-10-CM

## 2019-01-01 LAB
25(OH)D3 SERPL-MCNC: 85.2 NG/ML (ref 30–100)
ALBUMIN SERPL-MCNC: 2.9 G/DL (ref 3.4–5)
ALBUMIN/GLOB SERPL: 0.7 {RATIO} (ref 0.8–1.7)
ALP SERPL-CCNC: 144 U/L (ref 45–117)
ALT SERPL-CCNC: 7 U/L (ref 13–56)
ANION GAP SERPL CALC-SCNC: 2 MMOL/L (ref 3–18)
AST SERPL-CCNC: 9 U/L (ref 15–37)
BASO+EOS+MONOS # BLD AUTO: 0.5 K/UL (ref 0–2.3)
BASO+EOS+MONOS NFR BLD AUTO: 6 % (ref 0.1–17)
BILIRUB SERPL-MCNC: 0.3 MG/DL (ref 0.2–1)
BUN SERPL-MCNC: 23 MG/DL (ref 7–18)
BUN/CREAT SERPL: 42 (ref 12–20)
CALCIUM SERPL-MCNC: 8.5 MG/DL (ref 8.5–10.1)
CHLORIDE SERPL-SCNC: 101 MMOL/L (ref 100–108)
CO2 SERPL-SCNC: 37 MMOL/L (ref 21–32)
CREAT SERPL-MCNC: 0.55 MG/DL (ref 0.6–1.3)
DIFFERENTIAL METHOD BLD: ABNORMAL
ERYTHROCYTE [DISTWIDTH] IN BLOOD BY AUTOMATED COUNT: 14.3 % (ref 11.5–14.5)
FERRITIN SERPL-MCNC: 52 NG/ML (ref 8–388)
GLOBULIN SER CALC-MCNC: 4.1 G/DL (ref 2–4)
GLUCOSE SERPL-MCNC: 85 MG/DL (ref 74–99)
HCT VFR BLD AUTO: 41.1 % (ref 36–48)
HGB BLD-MCNC: 12.2 G/DL (ref 12–16)
INR BLD: 1.4 (ref 2–3)
INR BLD: 1.9
INR BLD: 1.9 (ref 2–3)
INR BLD: 1.9 (ref 2–3)
INR BLD: 2 (ref 2–3)
INR BLD: 2.2 (ref 2–3)
INR BLD: 2.2 (ref 2–3)
INR BLD: 2.4 (ref 2–3)
INR BLD: 2.7 (ref 2–3)
INR BLD: 3 (ref 2–3)
INR BLD: 3.5 (ref 2–3)
INR BLD: 4.1 (ref 2–3)
INR, EXTERNAL: 2.51
IRON SATN MFR SERPL: 10 %
IRON SERPL-MCNC: 28 UG/DL (ref 50–175)
LYMPHOCYTES # BLD: 1.1 K/UL (ref 1.1–5.9)
LYMPHOCYTES NFR BLD: 13 % (ref 14–44)
MCH RBC QN AUTO: 26.3 PG (ref 25–35)
MCHC RBC AUTO-ENTMCNC: 29.7 G/DL (ref 31–37)
MCV RBC AUTO: 88.6 FL (ref 78–102)
NEUTS SEG # BLD: 7.1 K/UL (ref 1.8–9.5)
NEUTS SEG NFR BLD: 81 % (ref 40–70)
PLATELET # BLD AUTO: 215 K/UL (ref 140–440)
POTASSIUM SERPL-SCNC: 4.6 MMOL/L (ref 3.5–5.5)
PROT SERPL-MCNC: 7 G/DL (ref 6.4–8.2)
RBC # BLD AUTO: 4.64 M/UL (ref 4.1–5.1)
SODIUM SERPL-SCNC: 140 MMOL/L (ref 136–145)
TIBC SERPL-MCNC: 276 UG/DL (ref 250–450)
VALID INTERNAL CONTROL?: YES
WBC # BLD AUTO: 8.7 K/UL (ref 4.5–13)

## 2019-01-01 PROCEDURE — 80053 COMPREHEN METABOLIC PANEL: CPT

## 2019-01-01 PROCEDURE — 82306 VITAMIN D 25 HYDROXY: CPT

## 2019-01-01 PROCEDURE — 83540 ASSAY OF IRON: CPT

## 2019-01-01 PROCEDURE — 36415 COLL VENOUS BLD VENIPUNCTURE: CPT

## 2019-01-01 PROCEDURE — 82728 ASSAY OF FERRITIN: CPT

## 2019-01-01 RX ORDER — WARFARIN 1 MG/1
TABLET ORAL
Qty: 150 TAB | Refills: 10 | Status: SHIPPED | OUTPATIENT
Start: 2019-01-01

## 2019-01-07 NOTE — PATIENT INSTRUCTIONS
Patient is to hold coumadin for two days then resume back at 4mg  and recheck in one week daily per

## 2019-04-29 NOTE — PATIENT INSTRUCTIONS
Ã¯Â»Â Anticoagulants: After Your Visit Your Care Instructions Your doctor prescribed an anticoagulant medicine. Anticoagulants, often called blood thinners, prevent new blood clots from forming and keep existing clots from getting larger. They do not actually thin the blood, but they make the blood take longer to clot. This lowers the risk of a blood clot moving to the lungs (pulmonary embolism) or moving to the brain and causing a stroke. Blood thinners come in two forms. Heparin is given by shot, either under your skin or through a needle in your vein, and starts working right away. Warfarin (Coumadin) comes in pill form and takes longer to work. Your doctor may have you begin taking both forms at the same time. As soon as the pills start to work, you will stop the shots but continue to take the pills. If you have a blood clot in your leg, you may need to take warfarin for several months. People who have heart conditions such as atrial fibrillation often need to take it for the rest of their lives. The right dose of this medicine is different for each person. You will need regular blood tests to see if your dose is correct. Follow-up care is a key part of your treatment and safety. Be sure to make and go to all appointments, and call your doctor if you are having problems. Itâs also a good idea to know your test results and keep a list of the medicines you take. How do you take blood thinners? · Take your medicines exactly as prescribed. Call your doctor if you think you are having a problem with your medicine. · Call your doctor if you are not sure what to do if you missed a dose of blood thinner. ¨ Your doctor can tell you exactly what to do so you do not take too much or too little blood thinner. Then you will be as safe as possible. · Some general rules for what to do if you miss a dose: ¨ If you remember it in the same day, take the missed dose. Then go back to your regular schedule. ¨ If it is the next day, or almost time to take the next dose, do not take the missed dose. Do not double the dose to make up for the missed one. At your next regularly scheduled time, take your normal dose. ¨ If you miss your dose for 2 or more days, call your doctor. · To help you stay on schedule, use a calendar to remind you when to take your blood thinner. When you take the medicine, note it on the calendar. · If you are going to give yourself shots, your doctor will give you instructions for how to safely inject the medicine. Follow the directions carefully. · Do not take any vitamins, over-the-counter medicines, or herbal products without talking to your doctor first. 
· Avoid contact sports and other activities that could lead to injury. Make your home safe and take other measures to reduce your risk of falling. Always wear a seat belt while in a car. · Do not suddenly change your intake of vitamin Kârich foods, such as broccoli, cabbage, asparagus, lettuce, and spinach. This will help blood thinners work evenly from day to day. · Limit alcohol to 2 drinks a day for men and 1 drink a day for women. Alcohol may interfere with blood thinners. It also increases your risk of falls, which can cause bruising and bleeding. · Tell your dentist, pharmacist, and other health professionals that you take blood thinners. Wear medical alert jewelry that says you take blood thinners. You can buy this at most drugstores. When should you call for help? Call 911 anytime you think you may need emergency care. For example, call if: 
· You cough up blood. · You vomit blood or what looks like coffee grounds. · You pass maroon or very bloody stools. Call your doctor now or seek immediate medical care if: 
· You have new bruises or blood spots under your skin. · You have a nosebleed. · Your gums bleed when you brush your teeth. · You have blood in your urine. · Your stools are black and tarlike or have streaks of blood. · You have vaginal bleeding when you are not having your period, or heavy period bleeding. Watch closely for changes in your health, and be sure to contact your doctor if: 
· You have questions about your medicine. Where can you learn more? Go to Soundst[employer] Enter X644 in the search box to learn more about \"Anticoagulants: After Your Visit. \" Â© 6422-3460 [employer], [employer]orporated. Care instructions adapted under license by [address] [place] [address] (which disclaims liability or warranty for this information). This care instruction is for use with your licensed healthcare professional. If you have questions about a medical condition or this instruction, always ask your healthcare professional. [address] any warranty or liability for your use of this information. Content Version: 9.0.60605; Last Revised: July 1, 2009Calcium/Vitamin D Supplement (By mouth) Calcium (JARED-see-um), Vitamin D (VYE-ta-min D) Supplies your body with calcium if you need more than you get in your diet. Calcium helps prevent osteoporosis (weak or brittle bones). Vitamin D helps your body use the calcium. Calcium and vitamin D are minerals that your body needs to work properly. Brand Name(s): Ibrahima-Citrate, Ibrahima-Citrate Plus Vitamin D, Calcet Petites, Calcium 600MG+D, Calcium Citrate +D3 Maximum, Caltrate 600 + D, Citracal + D, Citracal Calcium Citrate Petites with Vitamin D, Citracal Calcium Pearls, Citracal Maximum+D3, Citracal Petites, Citracal Ultradense Calcium Citrate, Citracal Ultradense Calcium Citrate Petite w/Vit D, Citrus Calcium with Vitamin D, D-1000 There may be other brand names for this medicine. When This Medicine Should Not Be Used: You should not use this medicine if you have had an allergic reaction to calcium or vitamin D (ergocalciferol). How to Use This Medicine: Tablet, Long Acting Tablet, Fizzy Tablet, Liquid Filled Capsule, Chewable Tablet · Your doctor will tell you how much medicine to use. Do not use more than directed. · Follow the instructions on the medicine label if you are using this medicine without a prescription. Ask your pharmacist or health caregiver if you are not sure how much calcium you should take in one day. · Most calcium supplements should be taken with food, but some kinds of calcium (such as calcium citrate) can be taken with or without food. Ask your health care provider or read the label on the bottle to see if you need to take your specific kind of calcium with food. Drink a full glass of water (8 ounces) with each dose. · If you are using the effervescent (fizzy) tablet, dissolve the tablet in about 6 to 8 ounces of water (3/4 cup to 1 cup). After the tablet is completely dissolved, drink this mixture right away. Do not save any mixture to take later. · Carefully follow your doctor's instructions about any special diet. · If you need to take more than one dose each a day, take each dose at evenly spaced times, unless your doctor has told you otherwise. If a dose is missed: · Take a dose as soon as you remember. If it is almost time for your next dose, wait until then and take a regular dose. Do not take extra medicine to make up for a missed dose. How to Store and Dispose of This Medicine: · Store the medicine in a closed container at room temperature, away from heat, moisture, and direct light. If the effervescent (fizzy) tablet comes packaged in foil, do not open the foil until you are ready to use each tablet. · Ask your pharmacist, doctor, or health caregiver about the best way to dispose of any outdated medicine or medicine no longer needed. · Keep all medicine out of the reach of children. Never share your medicine with anyone. Drugs and Foods to Avoid: Ask your doctor or pharmacist before using any other medicine, including over-the-counter medicines, vitamins, and herbal products. · Make sure your doctor knows if you are also using other supplements or medicines that contain calcium. Tell your doctor if you are also using gallium nitrate (Ganite®), cellulose sodium phosphate (Calcibind®), or etidronate (Didronel®). · Calcium can change the way other medicines work if you take them at the same time. If you need to use other medicines, take them at least 2 hours before or 2 hours after you take your calcium supplement. This is particularly important if you are also using phenytoin (Dilantin®) or a tetracycline antibiotic to treat an infection (such as doxycycline, minocycline, Vibramycin®). · Do not take your calcium supplement with a high-fiber meal (such as bran, whole-grain cereal or bread, fresh fruits). Do not smoke cigarettes or cigars. Do not drink large amounts of alcohol or caffeine (for example, more than about 8 cups of coffee). Warnings While Using This Medicine: · Make sure your doctor knows if you are pregnant or breast feeding, or if you have kidney disease or have ever had kidney stones. Tell your doctor if you have had problems with too much calcium (hypercalcemia) or too little calcium in your blood (hypocalcemia). Some health problems that can cause hypercalcemia are sarcoidosis, or problems with your parathyroid gland. · You should not use certain brands of this medicine if you have kidney disease or are on dialysis, because they may harm your kidneys. Ask your caregiver what brands are best for you. · Some health problems can affect how much calcium you should take. Tell your doctor if you have stomach or digestion problems, such as on-going diarrhea, not absorbing nutrients properly, or not having enough acid in your stomach. · This medicine might contain phenylalanine (aspartame).  This is only a concern if you have a disorder called phenylketonuria (a problem with amino acids). If you have this condition, talk to your doctor before using this medicine. · If you are using a large amount of calcium or using it for a long time, your doctor might need to check your blood on a regular basis. Be sure to keep all appointments. Possible Side Effects While Using This Medicine:  
Call your doctor right away if you notice any of these side effects: 
· Headache that will not go away, dry mouth, loss of appetite, severe constipation. If you notice other side effects that you think are caused by this medicine, tell your doctor. Call your doctor for medical advice about side effects. You may report side effects to FDA at 5-891-FDA-9634 © 2017 [address] Information is for End User's use only and may not be sold, redistributed or otherwise used for commercial purposes. The above information is an  only. It is not intended as medical advice for individual conditions or treatments. Talk to your doctor, nurse or pharmacist before following any medical regimen to see if it is safe and effective for you.

## 2019-04-29 NOTE — PROGRESS NOTES
Hematology/Oncology  Progress Note Name: Hilaria Browning Date: 2019 : 1935 Primary Care Provider: Gwendolynn Koyanagi, MD 
 
 
Ms. Michelle Mares is a 82y.o. year old female who was seen for management of her thrombophilia. Current therapy: Coumadin 4 mg daily. Subjective:  
 
Ms. Michelle Mares is a 26-year-old woman who has an underlying thrombophilia disorder. She is on chronic anticoagulation therapy. Currently she is taking 4 mg of coumadin daily. Her INR value done on 2019 was 2.51. She has not experienced any shortness of breath, cough, bruising, or bleeding. She remained essentially wheelchair confined. She was denied home INR device per insurance. She comes to the clinic every 2 weeks to have her INR checked. The patient has chronic arthritic discomfort in her lower extremities as well. The past medical, surgical and social history has been reviewed and remains unchanged. Past Medical History:  
Diagnosis Date  Diverticulosis  Hypercholesterolemia  Hypertension  Polio  Thromboembolus ([facility] [address]  Left leg, indefinite anticoag Dr. Pete Clarity NR goal 1.5-2 History reviewed. No pertinent surgical history. Social History Socioeconomic History  Marital status:  Spouse name: Not on file  Number of children: Not on file  Years of education: Not on file  Highest education level: Not on file Occupational History  Occupation: n/a Social Needs  Financial resource strain: Not on file  Food insecurity:  
  Worry: Not on file Inability: Not on file  Transportation needs:  
  Medical: Not on file Non-medical: Not on file Tobacco Use  Smoking status: Never Smoker  Smokeless tobacco: Never Used Substance and Sexual Activity  Alcohol use: No  
 Drug use: Not on file  Sexual activity: Not Currently Partners: Male Lifestyle  Physical activity:  
  Days per week: Not on file Minutes per session: Not on file  Stress: Not on file Relationships  Social connections:  
  Talks on phone: Not on file Gets together: Not on file Attends Rastafarian service: Not on file Active member of club or organization: Not on file Attends meetings of clubs or organizations: Not on file Relationship status: Not on file  Intimate partner violence:  
  Fear of current or ex partner: Not on file Emotionally abused: Not on file Physically abused: Not on file Forced sexual activity: Not on file Other Topics Concern  Not on file Social History Narrative  Not on file Family History Problem Relation Age of Onset  Asthma Father Current Outpatient Medications Medication Sig Dispense Refill  warfarin (COUMADIN) 1 mg tablet TAKE 1 TO 5 TABLETS BY MOUTH DAILY PENDING THE INR VALUES 150 Tab 10  
 warfarin (COUMADIN) 5 mg tablet TAKE 1 TABLET (5 MG) BY ORAL ROUTE ONCE DAILY 30 Tab 11  
 valsartan (DIOVAN) 160 mg tablet TAKE ONE TABLET BY MOUTH EVERY DAY FOR BLOOD PRESSURE 90 Tab 3  
 ergocalciferol (ERGOCALCIFEROL) 50,000 unit capsule Take 1 Cap by mouth every seven (7) days. Indications: Vitamin D Deficiency 12 Cap 3  
 calcium carbonate (CALCIUM 500) 500 mg calcium (1,250 mg) tablet Take 1 Tab by mouth daily. 90 Tab 1  VITAMIN B-12 1,000 mcg tablet   0  
 furosemide (LASIX) 40 mg tablet 40 mg.    
 folic acid-vit L4-DPY G50 (FOLTX) 2.5-25-2 mg tablet Take 1 Tab by Mouth Once a Day.  ferrous sulfate 325 mg (65 mg iron) tablet 325 mg.  pantoprazole (PROTONIX) 20 mg tablet 20 mg.    
 acetaminophen (TYLENOL) 500 mg tablet 500 mg.  cyanocobalamin 1,000 mcg tablet 1,000 mcg.     
 ferrous sulfate (IRON) 325 mg (65 mg iron) EC tablet   0  
 metoprolol tartrate (LOPRESSOR) 50 mg tablet   0  
 atenolol (TENORMIN) 50 mg tablet TAKE ONE TABLET BY MOUTH ONE TIME DAILY  90 Tab 0  
  KLOR-CON M20 20 mEq tablet TAKE ONE TABLET BY MOUTH TWICE DAILY  60 Tab 0  
 traMADol (ULTRAM) 50 mg tablet TAKE ONE TABLET BY MOUTH EVERY 6 HOURS AS NEEDED FOR PAIN 60 Tab 0  pravastatin (PRAVACHOL) 40 mg tablet TAKE 1  TABLET BY MOUTH EVERY EVENING 90 Tab 3  
 magnesium oxide (MAG-OX) 400 mg tablet Take 1 Tab by mouth daily. 90 Tab 3  
 albuterol (PROVENTIL HFA, VENTOLIN HFA, PROAIR HFA) 90 mcg/actuation inhaler Take 2 Puffs by inhalation every four (4) hours as needed for Wheezing. 1 Inhaler 0  
 fluticasone (FLONASE) 50 mcg/actuation nasal spray 2 sprays each nostril once daily 1 Bottle 0  
 cetirizine (ZYRTEC) 10 mg tablet Take 1 Tab by mouth daily. 30 Tab 11  
 miscellaneous medical supply misc Please extend/provide mobile wheelchair  Dx: polio contracture 1 Each 0 Review of Systems Constitutional: The patient has no acute distress or discomfort. HEENT: The patient denies recent head trauma, eye pain, blurred vision, hearing deficit, oropharyngeal mucosal pain or lesions, and the patient denies throat pain or discomfort. Lymphatics: The patient denies palpable peripheral lymphadenopathy. Hematologic: The patient denies having bruising, bleeding, or progressive fatigue. Respiratory: Patient denies having shortness of breath, cough, sputum production, fever, or dyspnea on exertion. Cardiovascular: The patient denies having leg pain, leg swelling, heart palpitations, chest pain, or lightheadedness. The patient denies having dyspnea on exertion. Gastrointestinal: The patient denies having nausea, emesis, or diarrhea. The patient denies having any hematemesis or blood in the stool. Genitourinary: Patient denies having urinary urgency, frequency, or dysuria. The patient denies having blood in the urine. Psychological: The patient denies having symptoms of nervousness, anxiety, depression, or thoughts of harming himself some of this. Skin: Patient denies having skin rashes, skin, ulcerations, or unexplained itching or pruritus. Musculoskeletal: Occasional arthritic pain at multiple joints, not new. Wheelchair. Limited ROM at RLE 2' polio hx. Objective:  
 
Visit Vitals /72 Pulse 62 Temp 97.6 °F (36.4 °C) (Oral) Resp 18 Ht 4' 11\" (1.499 m) SpO2 94% Pain Score: 0/10 Physical Exam:  
Gen. Appearance: The patient is in no acute distress. Skin: There is no bruise or rash. HEENT: The exam is unremarkable. Neck: Supple without lymphadenopathy or thyromegaly. Lungs: Clear to auscultation and percussion; there are no wheezes or rhonchi. Heart: Regular rate and rhythm; there are no murmurs, gallops, or rubs. Anterior chest wall and breasts: Deferred. Abdomen: Bowel sounds are present and normal. There is no guarding, tenderness, or hepatosplenomegaly. Extremities: There is no clubbing, cyanosis, or edema. Neurologic: There are no focal neurologic deficits. Lymphatics: There is no palpable peripheral lymphadenopathy. Musculoskeletal: LROM and non-weight bearing at RLE. Otherwise FROM at all other ext's. Psychological/psychiatric: There is no clinical evidence of anxiety, depression, or melancholy. Laboratory Data:    
Results for orders placed or performed during the hospital encounter of 04/29/19 CBC WITH 3 PART DIFF     Status: Abnormal  
Result Value Ref Range Status WBC 8.7 4.5 - 13.0 K/uL Final  
 RBC 4.64 4.10 - 5.10 M/uL Final  
 HGB 12.2 12.0 - 16 g/dL Final  
 HCT 41.1 36 - 48 % Final  
 MCV 88.6 78 - 102 FL Final  
 MCH 26.3 25.0 - 35.0 PG Final  
 MCHC 29.7 (L) 31 - 37 g/dL Final  
 RDW 14.3 11.5 - 14.5 % Final  
 PLATELET 196 688 - 908 K/uL Final  
 NEUTROPHILS 81 (H) 40 - 70 % Final  
 MIXED CELLS 6 0.1 - 17 % Final  
 LYMPHOCYTES 13 (L) 14 - 44 % Final  
 ABS. NEUTROPHILS 7.1 1.8 - 9.5 K/UL Final  
 ABS. MIXED CELLS 0.5 0.0 - 2.3 K/uL Final  
 ABS.  LYMPHOCYTES 1.1 1.1 - 5.9 K/UL Final  
 Comment: Test performed at [address] [place] [address] or Outpatient [facility]fusion [facility] Location. Reviewed by Medical Director. DF AUTOMATED   Final  
 
 
 
  
Patient Active Problem List  
Diagnosis Code  Polio A80.9  Hypertension I10  Morbid obesity (HCC) E66.01  
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  
 Iron deficiency anemia secondary to inadequate dietary iron intake D50.8 Assessment: 1. Iron deficiency anemia secondary to inadequate dietary iron intake 2. Thrombophilia ([address] 3. Vitamin D deficiency 4. 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 Neutrophilia Plan: Thrombophilia/DVT left lower extremity: I have explained to the patient that the current recent value of her INR is 2.51.she will continue her current dose of Coumadin at 4 mg Coumadin. Will recheck INR again in 2 weeks. The current CBC shows a stable WBC count of 8.7  the hemoglobin is 12.2 g/dL with hematocrit of 41.1%. The platelet count is currently 215,000. .  I will have her return to clinic for a complete reassessment again in 12 weeks. She will return to our clinic for INR check in 2 weeks. Vitamin D deficiency: The patient has previously been prescribed vitamin D supplementation. We will recheck her level today. If she does need the vitamin D supplement this will be provided. Primary osteoarthritis of both knees: The patient is currently taking tramadol 50 mg every 8 hours on a when necessary basis.   I have advised the patient to also use Tylenol arthritis 2 tablets every 8 hours as needed. Since she is on long-term anticoagulation the patient was advised not to use over-the-counter NSAIDs such as aspirin, Motrin, or Aleve. These medications can increase the risk of bleeding again also impair renal function. Thrombophilia (persisting problem) I have explained to the patient and her neutrophil count is elevated to 81 %. However the absolute neutrophil count remains normal at 7.1 with an absolute lymphocyte count of 1.1. We will monitor this at 12 week intervals. If there is persistent elevation in 12 weeks we will order flow cytometry for additional assessments. The patient will return to clinic for a followup visit in 4 months. Orders Placed This Encounter  COMPLETE CBC & AUTO DIFF WBC  InHouse CBC ([place]) Standing Status:   Future Number of Occurrences:   1 Standing Expiration Date:   5/6/2019  
 IRON PROFILE Standing Status:   Future Standing Expiration Date:   4/29/2020  METABOLIC PANEL, COMPREHENSIVE Standing Status:   Future Standing Expiration Date:   4/29/2020  VITAMIN D, 25 HYDROXY Standing Status:   Future Standing Expiration Date:   5/29/2019  FERRITIN Standing Status:   Future Standing Expiration Date:   4/29/2020 Jamarcus Alberts NP 
4/29/2019 I have assessed the patient independently and  agree with the full assessment as outlined.  
Maximino Bearden MD, [address]

## 2019-05-07 NOTE — TELEPHONE ENCOUNTER
Vandana FORDE with [address]. Patients INR 2.4 PT 29.1 Patient is taking 4 mg coumadin daily. Call back number for Lesa Maria is [phone].

## 2019-06-10 NOTE — TELEPHONE ENCOUNTER
Jovanni Krishnan patients daughter left voice message. Patient wants to know when does she start back on coumadin and how many is she supposed to take.